# Patient Record
Sex: FEMALE | Race: WHITE | NOT HISPANIC OR LATINO | Employment: OTHER | URBAN - NONMETROPOLITAN AREA
[De-identification: names, ages, dates, MRNs, and addresses within clinical notes are randomized per-mention and may not be internally consistent; named-entity substitution may affect disease eponyms.]

---

## 2021-01-01 ENCOUNTER — HOSPITAL ENCOUNTER (EMERGENCY)
Facility: HOSPITAL | Age: 71
End: 2021-05-14
Attending: EMERGENCY MEDICINE

## 2021-01-01 ENCOUNTER — APPOINTMENT (EMERGENCY)
Dept: RADIOLOGY | Facility: HOSPITAL | Age: 71
End: 2021-01-01

## 2021-01-01 ENCOUNTER — APPOINTMENT (INPATIENT)
Dept: RADIOLOGY | Facility: HOSPITAL | Age: 71
DRG: 260 | End: 2021-01-01
Payer: COMMERCIAL

## 2021-01-01 ENCOUNTER — HOSPITAL ENCOUNTER (INPATIENT)
Facility: HOSPITAL | Age: 71
LOS: 2 days | DRG: 260 | End: 2021-05-16
Attending: INTERNAL MEDICINE | Admitting: EMERGENCY MEDICINE
Payer: COMMERCIAL

## 2021-01-01 ENCOUNTER — TELEPHONE (OUTPATIENT)
Dept: RADIOLOGY | Facility: HOSPITAL | Age: 71
End: 2021-01-01

## 2021-01-01 ENCOUNTER — APPOINTMENT (INPATIENT)
Dept: NON INVASIVE DIAGNOSTICS | Facility: HOSPITAL | Age: 71
DRG: 260 | End: 2021-01-01
Payer: COMMERCIAL

## 2021-01-01 VITALS
DIASTOLIC BLOOD PRESSURE: 76 MMHG | SYSTOLIC BLOOD PRESSURE: 147 MMHG | HEART RATE: 82 BPM | WEIGHT: 124.34 LBS | OXYGEN SATURATION: 79 % | TEMPERATURE: 96.5 F | RESPIRATION RATE: 18 BRPM

## 2021-01-01 VITALS
DIASTOLIC BLOOD PRESSURE: 54 MMHG | HEART RATE: 98 BPM | SYSTOLIC BLOOD PRESSURE: 79 MMHG | OXYGEN SATURATION: 96 % | RESPIRATION RATE: 21 BRPM | TEMPERATURE: 97.5 F | WEIGHT: 133.6 LBS

## 2021-01-01 DIAGNOSIS — N17.9 AKI (ACUTE KIDNEY INJURY) (HCC): ICD-10-CM

## 2021-01-01 DIAGNOSIS — I46.9 CARDIAC ARREST (HCC): Primary | ICD-10-CM

## 2021-01-01 DIAGNOSIS — J96.01 ACUTE RESPIRATORY FAILURE WITH HYPOXIA (HCC): ICD-10-CM

## 2021-01-01 DIAGNOSIS — R57.9 SHOCK (HCC): ICD-10-CM

## 2021-01-01 DIAGNOSIS — E87.2 METABOLIC ACIDOSIS: ICD-10-CM

## 2021-01-01 DIAGNOSIS — E87.2 LACTIC ACIDOSIS: ICD-10-CM

## 2021-01-01 DIAGNOSIS — I21.3 STEMI (ST ELEVATION MYOCARDIAL INFARCTION) (HCC): ICD-10-CM

## 2021-01-01 LAB
ABO GROUP BLD: NORMAL
ABO GROUP BLD: NORMAL
ALBUMIN SERPL BCP-MCNC: 1.8 G/DL (ref 3.5–5)
ALBUMIN SERPL BCP-MCNC: 2.3 G/DL (ref 3.5–5)
ALBUMIN SERPL BCP-MCNC: 2.4 G/DL (ref 3.5–5)
ALBUMIN SERPL BCP-MCNC: 2.5 G/DL (ref 3.5–5)
ALP SERPL-CCNC: 73 U/L (ref 46–116)
ALP SERPL-CCNC: 82 U/L (ref 46–116)
ALP SERPL-CCNC: 85 U/L (ref 46–116)
ALP SERPL-CCNC: 91 U/L (ref 46–116)
ALP SERPL-CCNC: 94 U/L (ref 46–116)
ALP SERPL-CCNC: 98 U/L (ref 46–116)
ALT SERPL W P-5'-P-CCNC: 105 U/L (ref 12–78)
ALT SERPL W P-5'-P-CCNC: 125 U/L (ref 12–78)
ALT SERPL W P-5'-P-CCNC: 1465 U/L (ref 12–78)
ALT SERPL W P-5'-P-CCNC: 1955 U/L (ref 12–78)
ALT SERPL W P-5'-P-CCNC: 1989 U/L (ref 12–78)
ALT SERPL W P-5'-P-CCNC: 2496 U/L (ref 12–78)
ANION GAP SERPL CALCULATED.3IONS-SCNC: 14 MMOL/L (ref 4–13)
ANION GAP SERPL CALCULATED.3IONS-SCNC: 15 MMOL/L (ref 4–13)
ANION GAP SERPL CALCULATED.3IONS-SCNC: 16 MMOL/L (ref 4–13)
ANION GAP SERPL CALCULATED.3IONS-SCNC: 17 MMOL/L (ref 4–13)
ANION GAP SERPL CALCULATED.3IONS-SCNC: 17 MMOL/L (ref 4–13)
ANION GAP SERPL CALCULATED.3IONS-SCNC: 18 MMOL/L (ref 4–13)
ANION GAP SERPL CALCULATED.3IONS-SCNC: 18 MMOL/L (ref 4–13)
ANION GAP SERPL CALCULATED.3IONS-SCNC: 20 MMOL/L (ref 4–13)
ANION GAP SERPL CALCULATED.3IONS-SCNC: 21 MMOL/L (ref 4–13)
ANION GAP SERPL CALCULATED.3IONS-SCNC: 21 MMOL/L (ref 4–13)
ANION GAP SERPL CALCULATED.3IONS-SCNC: 22 MMOL/L (ref 4–13)
ANION GAP SERPL CALCULATED.3IONS-SCNC: 22 MMOL/L (ref 4–13)
ANION GAP SERPL CALCULATED.3IONS-SCNC: 23 MMOL/L (ref 4–13)
ANION GAP SERPL CALCULATED.3IONS-SCNC: 23 MMOL/L (ref 4–13)
ANISOCYTOSIS BLD QL SMEAR: PRESENT
APAP SERPL-MCNC: 3 UG/ML (ref 10–20)
APTT PPP: 191 SECONDS (ref 23–37)
APTT PPP: 27 SECONDS (ref 23–37)
APTT PPP: 30 SECONDS (ref 23–37)
APTT PPP: 32 SECONDS (ref 23–37)
APTT PPP: 35 SECONDS (ref 23–37)
APTT PPP: 87 SECONDS (ref 23–37)
APTT PPP: >210 SECONDS (ref 23–37)
APTT PPP: >210 SECONDS (ref 23–37)
ARTERIAL PATENCY WRIST A: NO
ARTERIAL PATENCY WRIST A: YES
AST SERPL W P-5'-P-CCNC: 253 U/L (ref 5–45)
AST SERPL W P-5'-P-CCNC: 299 U/L (ref 5–45)
AST SERPL W P-5'-P-CCNC: 3559 U/L (ref 5–45)
AST SERPL W P-5'-P-CCNC: 5378 U/L (ref 5–45)
AST SERPL W P-5'-P-CCNC: 5545 U/L (ref 5–45)
AST SERPL W P-5'-P-CCNC: 6881 U/L (ref 5–45)
ATRIAL RATE: 107 BPM
ATRIAL RATE: 60 BPM
ATRIAL RATE: 60 BPM
ATRIAL RATE: 64 BPM
ATRIAL RATE: 68 BPM
ATRIAL RATE: 80 BPM
ATRIAL RATE: 84 BPM
ATRIAL RATE: 88 BPM
ATRIAL RATE: 97 BPM
ATRIAL RATE: 98 BPM
BACTERIA UR CULT: ABNORMAL
BACTERIA UR QL AUTO: ABNORMAL /HPF
BACTERIA UR QL AUTO: ABNORMAL /HPF
BASE EX.OXY STD BLDV CALC-SCNC: 34 % (ref 60–80)
BASE EX.OXY STD BLDV CALC-SCNC: 40.3 % (ref 60–80)
BASE EX.OXY STD BLDV CALC-SCNC: 43.1 % (ref 60–80)
BASE EX.OXY STD BLDV CALC-SCNC: 52.2 % (ref 60–80)
BASE EXCESS BLDA CALC-SCNC: -12.2 MMOL/L
BASE EXCESS BLDA CALC-SCNC: -12.5 MMOL/L
BASE EXCESS BLDA CALC-SCNC: -13 MMOL/L
BASE EXCESS BLDA CALC-SCNC: -13.1 MMOL/L
BASE EXCESS BLDA CALC-SCNC: -13.2 MMOL/L
BASE EXCESS BLDA CALC-SCNC: -17 MMOL/L
BASE EXCESS BLDA CALC-SCNC: -22.8 MMOL/L
BASE EXCESS BLDA CALC-SCNC: -6.5 MMOL/L
BASE EXCESS BLDA CALC-SCNC: -7 MMOL/L (ref -2–3)
BASE EXCESS BLDA CALC-SCNC: -7.5 MMOL/L
BASE EXCESS BLDA CALC-SCNC: -7.6 MMOL/L
BASE EXCESS BLDA CALC-SCNC: -8.4 MMOL/L
BASE EXCESS BLDA CALC-SCNC: -9 MMOL/L (ref -2–3)
BASE EXCESS BLDV CALC-SCNC: -12.4 MMOL/L
BASE EXCESS BLDV CALC-SCNC: -5.4 MMOL/L
BASE EXCESS BLDV CALC-SCNC: -9.1 MMOL/L
BASE EXCESS BLDV CALC-SCNC: -9.8 MMOL/L
BASOPHILS # BLD AUTO: 0.02 THOUSANDS/ΜL (ref 0–0.1)
BASOPHILS # BLD AUTO: 0.02 THOUSANDS/ΜL (ref 0–0.1)
BASOPHILS # BLD AUTO: 0.03 THOUSANDS/ΜL (ref 0–0.1)
BASOPHILS # BLD AUTO: 0.1 THOUSANDS/ΜL (ref 0–0.1)
BASOPHILS # BLD MANUAL: 0 THOUSAND/UL (ref 0–0.1)
BASOPHILS NFR BLD AUTO: 0 % (ref 0–1)
BASOPHILS NFR MAR MANUAL: 0 % (ref 0–1)
BILIRUB DIRECT SERPL-MCNC: 0.17 MG/DL (ref 0–0.2)
BILIRUB DIRECT SERPL-MCNC: 0.29 MG/DL (ref 0–0.2)
BILIRUB DIRECT SERPL-MCNC: 0.35 MG/DL (ref 0–0.2)
BILIRUB DIRECT SERPL-MCNC: 0.35 MG/DL (ref 0–0.2)
BILIRUB DIRECT SERPL-MCNC: 0.43 MG/DL (ref 0–0.2)
BILIRUB SERPL-MCNC: 0.21 MG/DL (ref 0.2–1)
BILIRUB SERPL-MCNC: 0.33 MG/DL (ref 0.2–1)
BILIRUB SERPL-MCNC: 0.45 MG/DL (ref 0.2–1)
BILIRUB SERPL-MCNC: 0.52 MG/DL (ref 0.2–1)
BILIRUB SERPL-MCNC: 0.58 MG/DL (ref 0.2–1)
BILIRUB SERPL-MCNC: 0.59 MG/DL (ref 0.2–1)
BILIRUB UR QL STRIP: ABNORMAL
BILIRUB UR QL STRIP: NEGATIVE
BLD GP AB SCN SERPL QL: NEGATIVE
BODY TEMPERATURE: 94.6 DEGREES FEHRENHEIT
BODY TEMPERATURE: 95.4 DEGREES FEHRENHEIT
BODY TEMPERATURE: 95.4 DEGREES FEHRENHEIT
BODY TEMPERATURE: 96.1 DEGREES FEHRENHEIT
BODY TEMPERATURE: 96.5 DEGREES FEHRENHEIT
BODY TEMPERATURE: 96.6 DEGREES FEHRENHEIT
BODY TEMPERATURE: 97.2 DEGREES FEHRENHEIT
BODY TEMPERATURE: 98.8 DEGREES FEHRENHEIT
BUN SERPL-MCNC: 27 MG/DL (ref 5–25)
BUN SERPL-MCNC: 29 MG/DL (ref 5–25)
BUN SERPL-MCNC: 32 MG/DL (ref 5–25)
BUN SERPL-MCNC: 33 MG/DL (ref 5–25)
BUN SERPL-MCNC: 35 MG/DL (ref 5–25)
BUN SERPL-MCNC: 35 MG/DL (ref 5–25)
BUN SERPL-MCNC: 36 MG/DL (ref 5–25)
BUN SERPL-MCNC: 37 MG/DL (ref 5–25)
BUN SERPL-MCNC: 37 MG/DL (ref 5–25)
BUN SERPL-MCNC: 38 MG/DL (ref 5–25)
BURR CELLS BLD QL SMEAR: PRESENT
CA-I BLD-SCNC: 0.98 MMOL/L (ref 1.12–1.32)
CA-I BLD-SCNC: 1.05 MMOL/L (ref 1.12–1.32)
CA-I BLD-SCNC: 1.09 MMOL/L (ref 1.12–1.32)
CA-I BLD-SCNC: 1.09 MMOL/L (ref 1.12–1.32)
CA-I BLD-SCNC: 1.1 MMOL/L (ref 1.12–1.32)
CA-I BLD-SCNC: 1.1 MMOL/L (ref 1.12–1.32)
CA-I BLD-SCNC: 1.13 MMOL/L (ref 1.12–1.32)
CA-I BLD-SCNC: 1.13 MMOL/L (ref 1.12–1.32)
CA-I BLD-SCNC: 1.18 MMOL/L (ref 1.12–1.32)
CALCIUM ALBUM COR SERPL-MCNC: 9 MG/DL (ref 8.3–10.1)
CALCIUM SERPL-MCNC: 6.8 MG/DL (ref 8.3–10.1)
CALCIUM SERPL-MCNC: 7.2 MG/DL (ref 8.3–10.1)
CALCIUM SERPL-MCNC: 7.4 MG/DL (ref 8.3–10.1)
CALCIUM SERPL-MCNC: 8 MG/DL (ref 8.3–10.1)
CALCIUM SERPL-MCNC: 8 MG/DL (ref 8.3–10.1)
CALCIUM SERPL-MCNC: 8.1 MG/DL (ref 8.3–10.1)
CALCIUM SERPL-MCNC: 8.1 MG/DL (ref 8.3–10.1)
CALCIUM SERPL-MCNC: 8.3 MG/DL (ref 8.3–10.1)
CALCIUM SERPL-MCNC: 8.7 MG/DL (ref 8.3–10.1)
CALCIUM SERPL-MCNC: 8.8 MG/DL (ref 8.3–10.1)
CALCIUM SERPL-MCNC: 8.8 MG/DL (ref 8.3–10.1)
CALCIUM SERPL-MCNC: 8.9 MG/DL (ref 8.3–10.1)
CALCIUM SERPL-MCNC: 9.1 MG/DL (ref 8.3–10.1)
CALCIUM SERPL-MCNC: 9.3 MG/DL (ref 8.3–10.1)
CHLORIDE SERPL-SCNC: 106 MMOL/L (ref 100–108)
CHLORIDE SERPL-SCNC: 107 MMOL/L (ref 100–108)
CHLORIDE SERPL-SCNC: 109 MMOL/L (ref 100–108)
CHLORIDE SERPL-SCNC: 110 MMOL/L (ref 100–108)
CHLORIDE SERPL-SCNC: 110 MMOL/L (ref 100–108)
CHLORIDE SERPL-SCNC: 113 MMOL/L (ref 100–108)
CHLORIDE SERPL-SCNC: 114 MMOL/L (ref 100–108)
CHLORIDE SERPL-SCNC: 114 MMOL/L (ref 100–108)
CHLORIDE SERPL-SCNC: 116 MMOL/L (ref 100–108)
CHLORIDE SERPL-SCNC: 116 MMOL/L (ref 100–108)
CHLORIDE SERPL-SCNC: 118 MMOL/L (ref 100–108)
CHLORIDE SERPL-SCNC: 118 MMOL/L (ref 100–108)
CLARITY UR: ABNORMAL
CLARITY UR: ABNORMAL
CO2 SERPL-SCNC: 11 MMOL/L (ref 21–32)
CO2 SERPL-SCNC: 13 MMOL/L (ref 21–32)
CO2 SERPL-SCNC: 16 MMOL/L (ref 21–32)
CO2 SERPL-SCNC: 17 MMOL/L (ref 21–32)
CO2 SERPL-SCNC: 19 MMOL/L (ref 21–32)
COLOR UR: ABNORMAL
COLOR UR: YELLOW
CORTIS SERPL-MCNC: 40.4 UG/DL
CREAT SERPL-MCNC: 1.68 MG/DL (ref 0.6–1.3)
CREAT SERPL-MCNC: 1.72 MG/DL (ref 0.6–1.3)
CREAT SERPL-MCNC: 1.77 MG/DL (ref 0.6–1.3)
CREAT SERPL-MCNC: 1.81 MG/DL (ref 0.6–1.3)
CREAT SERPL-MCNC: 1.85 MG/DL (ref 0.6–1.3)
CREAT SERPL-MCNC: 1.92 MG/DL (ref 0.6–1.3)
CREAT SERPL-MCNC: 1.96 MG/DL (ref 0.6–1.3)
CREAT SERPL-MCNC: 1.97 MG/DL (ref 0.6–1.3)
CREAT SERPL-MCNC: 2.16 MG/DL (ref 0.6–1.3)
CREAT SERPL-MCNC: 2.28 MG/DL (ref 0.6–1.3)
CREAT SERPL-MCNC: 2.3 MG/DL (ref 0.6–1.3)
CREAT SERPL-MCNC: 2.3 MG/DL (ref 0.6–1.3)
CREAT SERPL-MCNC: 2.39 MG/DL (ref 0.6–1.3)
CREAT SERPL-MCNC: 2.4 MG/DL (ref 0.6–1.3)
D DIMER PPP FEU-MCNC: >20 UG/ML FEU
DIGOXIN SERPL-MCNC: <0.2 NG/ML (ref 0.8–2)
DS:DELIVERY SYSTEM: ABNORMAL
EOSINOPHIL # BLD AUTO: 0 THOUSAND/ΜL (ref 0–0.61)
EOSINOPHIL # BLD AUTO: 0 THOUSAND/ΜL (ref 0–0.61)
EOSINOPHIL # BLD AUTO: 0.01 THOUSAND/ΜL (ref 0–0.61)
EOSINOPHIL # BLD AUTO: 0.01 THOUSAND/ΜL (ref 0–0.61)
EOSINOPHIL # BLD MANUAL: 0.18 THOUSAND/UL (ref 0–0.4)
EOSINOPHIL NFR BLD AUTO: 0 % (ref 0–6)
EOSINOPHIL NFR BLD MANUAL: 1 % (ref 0–6)
ERYTHROCYTE [DISTWIDTH] IN BLOOD BY AUTOMATED COUNT: 14.6 % (ref 11.6–15.1)
ERYTHROCYTE [DISTWIDTH] IN BLOOD BY AUTOMATED COUNT: 14.8 % (ref 11.6–15.1)
ERYTHROCYTE [DISTWIDTH] IN BLOOD BY AUTOMATED COUNT: 14.8 % (ref 11.6–15.1)
ERYTHROCYTE [DISTWIDTH] IN BLOOD BY AUTOMATED COUNT: 15 % (ref 11.6–15.1)
ERYTHROCYTE [DISTWIDTH] IN BLOOD BY AUTOMATED COUNT: 15 % (ref 11.6–15.1)
ERYTHROCYTE [DISTWIDTH] IN BLOOD BY AUTOMATED COUNT: 15.6 % (ref 11.6–15.1)
EST. AVERAGE GLUCOSE BLD GHB EST-MCNC: 134 MG/DL
FIO2 GAS DIL.REBREATH: 100 L
FIO2 GAS DIL.REBREATH: 6 L
GFR SERPL CREATININE-BSD FRML MDRD: 20 ML/MIN/1.73SQ M
GFR SERPL CREATININE-BSD FRML MDRD: 20 ML/MIN/1.73SQ M
GFR SERPL CREATININE-BSD FRML MDRD: 21 ML/MIN/1.73SQ M
GFR SERPL CREATININE-BSD FRML MDRD: 23 ML/MIN/1.73SQ M
GFR SERPL CREATININE-BSD FRML MDRD: 25 ML/MIN/1.73SQ M
GFR SERPL CREATININE-BSD FRML MDRD: 25 ML/MIN/1.73SQ M
GFR SERPL CREATININE-BSD FRML MDRD: 26 ML/MIN/1.73SQ M
GFR SERPL CREATININE-BSD FRML MDRD: 27 ML/MIN/1.73SQ M
GFR SERPL CREATININE-BSD FRML MDRD: 28 ML/MIN/1.73SQ M
GFR SERPL CREATININE-BSD FRML MDRD: 29 ML/MIN/1.73SQ M
GFR SERPL CREATININE-BSD FRML MDRD: 30 ML/MIN/1.73SQ M
GFR SERPL CREATININE-BSD FRML MDRD: 31 ML/MIN/1.73SQ M
GLUCOSE SERPL-MCNC: 134 MG/DL (ref 65–140)
GLUCOSE SERPL-MCNC: 135 MG/DL (ref 65–140)
GLUCOSE SERPL-MCNC: 140 MG/DL (ref 65–140)
GLUCOSE SERPL-MCNC: 146 MG/DL (ref 65–140)
GLUCOSE SERPL-MCNC: 147 MG/DL (ref 65–140)
GLUCOSE SERPL-MCNC: 155 MG/DL (ref 65–140)
GLUCOSE SERPL-MCNC: 155 MG/DL (ref 65–140)
GLUCOSE SERPL-MCNC: 157 MG/DL (ref 65–140)
GLUCOSE SERPL-MCNC: 162 MG/DL (ref 65–140)
GLUCOSE SERPL-MCNC: 166 MG/DL (ref 65–140)
GLUCOSE SERPL-MCNC: 166 MG/DL (ref 65–140)
GLUCOSE SERPL-MCNC: 169 MG/DL (ref 65–140)
GLUCOSE SERPL-MCNC: 171 MG/DL (ref 65–140)
GLUCOSE SERPL-MCNC: 172 MG/DL (ref 65–140)
GLUCOSE SERPL-MCNC: 175 MG/DL (ref 65–140)
GLUCOSE SERPL-MCNC: 180 MG/DL (ref 65–140)
GLUCOSE SERPL-MCNC: 188 MG/DL (ref 65–140)
GLUCOSE SERPL-MCNC: 202 MG/DL (ref 65–140)
GLUCOSE SERPL-MCNC: 204 MG/DL (ref 65–140)
GLUCOSE SERPL-MCNC: 205 MG/DL (ref 65–140)
GLUCOSE SERPL-MCNC: 208 MG/DL (ref 65–140)
GLUCOSE SERPL-MCNC: 209 MG/DL (ref 65–140)
GLUCOSE SERPL-MCNC: 209 MG/DL (ref 65–140)
GLUCOSE SERPL-MCNC: 210 MG/DL (ref 65–140)
GLUCOSE SERPL-MCNC: 219 MG/DL (ref 65–140)
GLUCOSE SERPL-MCNC: 229 MG/DL (ref 65–140)
GLUCOSE UR STRIP-MCNC: NEGATIVE MG/DL
GLUCOSE UR STRIP-MCNC: NEGATIVE MG/DL
HBA1C MFR BLD: 6.3 %
HCO3 BLDA-SCNC: 10.9 MMOL/L (ref 22–28)
HCO3 BLDA-SCNC: 11.2 MMOL/L (ref 22–28)
HCO3 BLDA-SCNC: 11.6 MMOL/L (ref 22–28)
HCO3 BLDA-SCNC: 12.1 MMOL/L (ref 22–28)
HCO3 BLDA-SCNC: 12.6 MMOL/L (ref 22–28)
HCO3 BLDA-SCNC: 14.4 MMOL/L (ref 22–28)
HCO3 BLDA-SCNC: 16.1 MMOL/L (ref 22–28)
HCO3 BLDA-SCNC: 16.2 MMOL/L (ref 22–28)
HCO3 BLDA-SCNC: 16.9 MMOL/L (ref 22–28)
HCO3 BLDA-SCNC: 17.3 MMOL/L (ref 22–28)
HCO3 BLDA-SCNC: 17.5 MMOL/L (ref 22–28)
HCO3 BLDA-SCNC: 7.4 MMOL/L (ref 22–28)
HCO3 BLDA-SCNC: 8.8 MMOL/L (ref 22–28)
HCO3 BLDV-SCNC: 13.4 MMOL/L (ref 24–30)
HCO3 BLDV-SCNC: 16 MMOL/L (ref 24–30)
HCO3 BLDV-SCNC: 16.2 MMOL/L (ref 24–30)
HCO3 BLDV-SCNC: 19.9 MMOL/L (ref 24–30)
HCT VFR BLD AUTO: 25.7 % (ref 34.8–46.1)
HCT VFR BLD AUTO: 30.6 % (ref 34.8–46.1)
HCT VFR BLD AUTO: 31.9 % (ref 34.8–46.1)
HCT VFR BLD AUTO: 32.7 % (ref 34.8–46.1)
HCT VFR BLD AUTO: 35.8 % (ref 34.8–46.1)
HCT VFR BLD AUTO: 36.1 % (ref 34.8–46.1)
HCT VFR BLD CALC: 29 % (ref 34.8–46.1)
HCT VFR BLD CALC: 33 % (ref 34.8–46.1)
HGB BLD-MCNC: 10.1 G/DL (ref 11.5–15.4)
HGB BLD-MCNC: 10.3 G/DL (ref 11.5–15.4)
HGB BLD-MCNC: 10.4 G/DL (ref 11.5–15.4)
HGB BLD-MCNC: 11.3 G/DL (ref 11.5–15.4)
HGB BLD-MCNC: 11.3 G/DL (ref 11.5–15.4)
HGB BLD-MCNC: 8.5 G/DL (ref 11.5–15.4)
HGB BLD-MCNC: 8.7 G/DL (ref 11.5–15.4)
HGB BLDA-MCNC: 11.2 G/DL (ref 11.5–15.4)
HGB BLDA-MCNC: 9.9 G/DL (ref 11.5–15.4)
HGB UR QL STRIP.AUTO: ABNORMAL
HGB UR QL STRIP.AUTO: ABNORMAL
HOROWITZ INDEX BLDA+IHG-RTO: 100 MM[HG]
HOROWITZ INDEX BLDA+IHG-RTO: 50 MM[HG]
HOROWITZ INDEX BLDA+IHG-RTO: 60 MM[HG]
HOROWITZ INDEX BLDA+IHG-RTO: 70 MM[HG]
HOROWITZ INDEX BLDA+IHG-RTO: 80 MM[HG]
HOROWITZ INDEX BLDA+IHG-RTO: 80 MM[HG]
I-TIME: 0.9
IMM GRANULOCYTES # BLD AUTO: 0.11 THOUSAND/UL (ref 0–0.2)
IMM GRANULOCYTES # BLD AUTO: 0.14 THOUSAND/UL (ref 0–0.2)
IMM GRANULOCYTES # BLD AUTO: 0.21 THOUSAND/UL (ref 0–0.2)
IMM GRANULOCYTES # BLD AUTO: 0.37 THOUSAND/UL (ref 0–0.2)
IMM GRANULOCYTES NFR BLD AUTO: 1 % (ref 0–2)
IMM GRANULOCYTES NFR BLD AUTO: 2 % (ref 0–2)
INR PPP: 1.35 (ref 0.84–1.19)
INR PPP: 1.35 (ref 0.84–1.19)
INR PPP: 1.36 (ref 0.84–1.19)
INR PPP: 1.49 (ref 0.84–1.19)
IPAP: 12
IRON SERPL-MCNC: 74 UG/DL (ref 50–170)
KETONES UR STRIP-MCNC: NEGATIVE MG/DL
KETONES UR STRIP-MCNC: NEGATIVE MG/DL
LACTATE SERPL-SCNC: 11.7 MMOL/L (ref 0.5–2)
LACTATE SERPL-SCNC: 11.8 MMOL/L (ref 0.5–2)
LACTATE SERPL-SCNC: 12.2 MMOL/L (ref 0.5–2)
LACTATE SERPL-SCNC: 12.2 MMOL/L (ref 0.5–2)
LACTATE SERPL-SCNC: 12.6 MMOL/L (ref 0.5–2)
LACTATE SERPL-SCNC: 12.7 MMOL/L (ref 0.5–2)
LACTATE SERPL-SCNC: 13.1 MMOL/L (ref 0.5–2)
LACTATE SERPL-SCNC: 13.6 MMOL/L (ref 0.5–2)
LACTATE SERPL-SCNC: 14.2 MMOL/L (ref 0.5–2)
LACTATE SERPL-SCNC: 4.1 MMOL/L (ref 0.5–2)
LACTATE SERPL-SCNC: 4.2 MMOL/L (ref 0.5–2)
LACTATE SERPL-SCNC: 4.7 MMOL/L (ref 0.5–2)
LACTATE SERPL-SCNC: 5 MMOL/L (ref 0.5–2)
LACTATE SERPL-SCNC: 5.8 MMOL/L (ref 0.5–2)
LACTATE SERPL-SCNC: 6.5 MMOL/L (ref 0.5–2)
LACTATE SERPL-SCNC: 7.2 MMOL/L (ref 0.5–2)
LACTATE SERPL-SCNC: 7.2 MMOL/L (ref 0.5–2)
LACTATE SERPL-SCNC: 7.8 MMOL/L (ref 0.5–2)
LACTATE SERPL-SCNC: 8.8 MMOL/L (ref 0.5–2)
LACTATE SERPL-SCNC: 9.3 MMOL/L (ref 0.5–2)
LACTATE SERPL-SCNC: 9.5 MMOL/L (ref 0.5–2)
LEUKOCYTE ESTERASE UR QL STRIP: ABNORMAL
LEUKOCYTE ESTERASE UR QL STRIP: ABNORMAL
LIPASE SERPL-CCNC: 283 U/L (ref 73–393)
LYMPHOCYTES # BLD AUTO: 0.78 THOUSANDS/ΜL (ref 0.6–4.47)
LYMPHOCYTES # BLD AUTO: 0.81 THOUSANDS/ΜL (ref 0.6–4.47)
LYMPHOCYTES # BLD AUTO: 0.93 THOUSANDS/ΜL (ref 0.6–4.47)
LYMPHOCYTES # BLD AUTO: 1.46 THOUSANDS/ΜL (ref 0.6–4.47)
LYMPHOCYTES # BLD AUTO: 1.93 THOUSAND/UL (ref 0.6–4.47)
LYMPHOCYTES # BLD AUTO: 11 % (ref 14–44)
LYMPHOCYTES NFR BLD AUTO: 4 % (ref 14–44)
LYMPHOCYTES NFR BLD AUTO: 4 % (ref 14–44)
LYMPHOCYTES NFR BLD AUTO: 5 % (ref 14–44)
LYMPHOCYTES NFR BLD AUTO: 9 % (ref 14–44)
MAGNESIUM SERPL-MCNC: 1.7 MG/DL (ref 1.6–2.6)
MAGNESIUM SERPL-MCNC: 2.1 MG/DL (ref 1.6–2.6)
MAGNESIUM SERPL-MCNC: 2.3 MG/DL (ref 1.6–2.6)
MAGNESIUM SERPL-MCNC: 2.5 MG/DL (ref 1.6–2.6)
MAGNESIUM SERPL-MCNC: 2.6 MG/DL (ref 1.6–2.6)
MAGNESIUM SERPL-MCNC: 2.7 MG/DL (ref 1.6–2.6)
MAGNESIUM SERPL-MCNC: 2.8 MG/DL (ref 1.6–2.6)
MAGNESIUM SERPL-MCNC: 3 MG/DL (ref 1.6–2.6)
MAGNESIUM SERPL-MCNC: 3.7 MG/DL (ref 1.6–2.6)
MCH RBC QN AUTO: 31.6 PG (ref 26.8–34.3)
MCH RBC QN AUTO: 31.7 PG (ref 26.8–34.3)
MCH RBC QN AUTO: 31.8 PG (ref 26.8–34.3)
MCH RBC QN AUTO: 32.1 PG (ref 26.8–34.3)
MCH RBC QN AUTO: 32.2 PG (ref 26.8–34.3)
MCH RBC QN AUTO: 32.7 PG (ref 26.8–34.3)
MCHC RBC AUTO-ENTMCNC: 31.3 G/DL (ref 31.4–37.4)
MCHC RBC AUTO-ENTMCNC: 31.6 G/DL (ref 31.4–37.4)
MCHC RBC AUTO-ENTMCNC: 31.8 G/DL (ref 31.4–37.4)
MCHC RBC AUTO-ENTMCNC: 32.3 G/DL (ref 31.4–37.4)
MCHC RBC AUTO-ENTMCNC: 33 G/DL (ref 31.4–37.4)
MCHC RBC AUTO-ENTMCNC: 33.1 G/DL (ref 31.4–37.4)
MCV RBC AUTO: 100 FL (ref 82–98)
MCV RBC AUTO: 101 FL (ref 82–98)
MCV RBC AUTO: 101 FL (ref 82–98)
MCV RBC AUTO: 97 FL (ref 82–98)
MCV RBC AUTO: 99 FL (ref 82–98)
MCV RBC AUTO: 99 FL (ref 82–98)
METAMYELOCYTES NFR BLD MANUAL: 1 % (ref 0–1)
MONOCYTES # BLD AUTO: 0.18 THOUSAND/UL (ref 0–1.22)
MONOCYTES # BLD AUTO: 1.27 THOUSAND/ΜL (ref 0.17–1.22)
MONOCYTES # BLD AUTO: 1.28 THOUSAND/ΜL (ref 0.17–1.22)
MONOCYTES # BLD AUTO: 1.48 THOUSAND/ΜL (ref 0.17–1.22)
MONOCYTES # BLD AUTO: 2.05 THOUSAND/ΜL (ref 0.17–1.22)
MONOCYTES NFR BLD AUTO: 8 % (ref 4–12)
MONOCYTES NFR BLD AUTO: 8 % (ref 4–12)
MONOCYTES NFR BLD AUTO: 9 % (ref 4–12)
MONOCYTES NFR BLD AUTO: 9 % (ref 4–12)
MONOCYTES NFR BLD: 1 % (ref 4–12)
MRSA NOSE QL CULT: ABNORMAL
MRSA NOSE QL CULT: ABNORMAL
MYELOCYTES NFR BLD MANUAL: 3 % (ref 0–1)
NASAL CANNULA: 4
NEUTROPHILS # BLD AUTO: 12.92 THOUSANDS/ΜL (ref 1.85–7.62)
NEUTROPHILS # BLD AUTO: 14 THOUSANDS/ΜL (ref 1.85–7.62)
NEUTROPHILS # BLD AUTO: 16.43 THOUSANDS/ΜL (ref 1.85–7.62)
NEUTROPHILS # BLD AUTO: 20.41 THOUSANDS/ΜL (ref 1.85–7.62)
NEUTROPHILS # BLD MANUAL: 14.58 THOUSAND/UL (ref 1.85–7.62)
NEUTS BAND NFR BLD MANUAL: 2 % (ref 0–8)
NEUTS SEG NFR BLD AUTO: 81 % (ref 43–75)
NEUTS SEG NFR BLD AUTO: 82 % (ref 43–75)
NEUTS SEG NFR BLD AUTO: 85 % (ref 43–75)
NEUTS SEG NFR BLD AUTO: 85 % (ref 43–75)
NEUTS SEG NFR BLD AUTO: 87 % (ref 43–75)
NITRITE UR QL STRIP: NEGATIVE
NITRITE UR QL STRIP: NEGATIVE
NON VENT- BIPAP: ABNORMAL
NON-SQ EPI CELLS URNS QL MICRO: ABNORMAL /HPF
NON-SQ EPI CELLS URNS QL MICRO: ABNORMAL /HPF
NRBC BLD AUTO-RTO: 0 /100 WBCS
NT-PROBNP SERPL-MCNC: 7841 PG/ML
O2 CT BLDA-SCNC: 10.2 ML/DL (ref 16–23)
O2 CT BLDA-SCNC: 11.1 ML/DL (ref 16–23)
O2 CT BLDA-SCNC: 11.3 ML/DL (ref 16–23)
O2 CT BLDA-SCNC: 12 ML/DL (ref 16–23)
O2 CT BLDA-SCNC: 13.1 ML/DL (ref 16–23)
O2 CT BLDA-SCNC: 14.7 ML/DL (ref 16–23)
O2 CT BLDA-SCNC: 14.8 ML/DL (ref 16–23)
O2 CT BLDA-SCNC: 15.8 ML/DL (ref 16–23)
O2 CT BLDA-SCNC: 17.1 ML/DL (ref 16–23)
O2 CT BLDA-SCNC: 17.1 ML/DL (ref 16–23)
O2 CT BLDA-SCNC: 9.9 ML/DL (ref 16–23)
O2 CT BLDV-SCNC: 4.5 ML/DL
O2 CT BLDV-SCNC: 5.3 ML/DL
O2 CT BLDV-SCNC: 5.7 ML/DL
O2 CT BLDV-SCNC: 6.9 ML/DL
OSMOLALITY UR/SERPL-RTO: 338 MMOL/KG (ref 282–298)
OVALOCYTES BLD QL SMEAR: PRESENT
OXYHGB MFR BLDA: 89.5 % (ref 94–97)
OXYHGB MFR BLDA: 92.5 % (ref 94–97)
OXYHGB MFR BLDA: 93.5 % (ref 94–97)
OXYHGB MFR BLDA: 94.4 % (ref 94–97)
OXYHGB MFR BLDA: 95.2 % (ref 94–97)
OXYHGB MFR BLDA: 95.9 % (ref 94–97)
OXYHGB MFR BLDA: 96.8 % (ref 94–97)
OXYHGB MFR BLDA: 97.4 % (ref 94–97)
OXYHGB MFR BLDA: 98.1 % (ref 94–97)
OXYHGB MFR BLDA: 98.2 % (ref 94–97)
OXYHGB MFR BLDA: 98.3 % (ref 94–97)
P AXIS: 4 DEGREES
P AXIS: 45 DEGREES
P AXIS: 69 DEGREES
P AXIS: 71 DEGREES
P AXIS: 71 DEGREES
P AXIS: 73 DEGREES
P AXIS: 78 DEGREES
P AXIS: 86 DEGREES
PCO2 BLD: 18 MMOL/L (ref 21–32)
PCO2 BLD: 18 MMOL/L (ref 21–32)
PCO2 BLD: 27 MM HG (ref 36–44)
PCO2 BLD: 29.3 MM HG (ref 42–50)
PCO2 BLD: 36.1 MM HG (ref 42–50)
PCO2 BLD: 38.7 MM HG (ref 36–44)
PCO2 BLDA: 20.5 MM HG (ref 36–44)
PCO2 BLDA: 22 MM HG (ref 36–44)
PCO2 BLDA: 22.4 MM HG (ref 36–44)
PCO2 BLDA: 22.4 MM HG (ref 36–44)
PCO2 BLDA: 23.2 MM HG (ref 36–44)
PCO2 BLDA: 23.9 MM HG (ref 36–44)
PCO2 BLDA: 25.4 MM HG (ref 36–44)
PCO2 BLDA: 25.5 MM HG (ref 36–44)
PCO2 BLDA: 27.7 MM HG (ref 36–44)
PCO2 BLDA: 28.9 MM HG (ref 36–44)
PCO2 BLDA: 30.5 MM HG (ref 36–44)
PCO2 BLDV: 30.3 MM HG (ref 42–50)
PCO2 BLDV: 31.6 MM HG (ref 42–50)
PCO2 BLDV: 35.9 MM HG (ref 42–50)
PCO2 BLDV: 37.9 MM HG (ref 42–50)
PCO2 TEMP ADJ BLDA: 18.9 MM HG (ref 36–44)
PCO2 TEMP ADJ BLDA: 22.5 MM HG (ref 36–44)
PCO2 TEMP ADJ BLDA: 23.9 MM HG (ref 36–44)
PCO2 TEMP ADJ BLDA: 24.3 MM HG (ref 36–44)
PCO2 TEMP ADJ BLDA: 26.7 MM HG (ref 36–44)
PCO2 TEMP ADJ BLDA: 28.9 MM HG (ref 36–44)
PEEP MAX SETTING VENT: 5 CM[H2O]
PEEP RESPIRATORY: 5 CM[H2O]
PEEP RESPIRATORY: 6 CM[H2O]
PEEP RESPIRATORY: 8 CM[H2O]
PH BLD: 7.01 [PH] (ref 7.35–7.45)
PH BLD: 7.26 [PH] (ref 7.35–7.45)
PH BLD: 7.28 [PH] (ref 7.3–7.4)
PH BLD: 7.33 [PH] (ref 7.35–7.45)
PH BLD: 7.35 [PH] (ref 7.3–7.4)
PH BLD: 7.37 [PH] (ref 7.35–7.45)
PH BLD: 7.41 [PH] (ref 7.35–7.45)
PH BLD: 7.42 [PH] (ref 7.35–7.45)
PH BLD: 7.42 [PH] (ref 7.35–7.45)
PH BLD: 7.43 [PH] (ref 7.35–7.45)
PH BLDA: 7 [PH] (ref 7.35–7.45)
PH BLDA: 7.22 [PH] (ref 7.35–7.45)
PH BLDA: 7.32 [PH] (ref 7.35–7.45)
PH BLDA: 7.34 [PH] (ref 7.35–7.45)
PH BLDA: 7.38 [PH] (ref 7.35–7.45)
PH BLDA: 7.4 [PH] (ref 7.35–7.45)
PH BLDA: 7.42 [PH] (ref 7.35–7.45)
PH BLDA: 7.43 [PH] (ref 7.35–7.45)
PH BLDV: 7.26 [PH] (ref 7.3–7.4)
PH BLDV: 7.27 [PH] (ref 7.3–7.4)
PH BLDV: 7.32 [PH] (ref 7.3–7.4)
PH BLDV: 7.34 [PH] (ref 7.3–7.4)
PH UR STRIP.AUTO: 5 [PH]
PH UR STRIP.AUTO: 6 [PH]
PHOSPHATE SERPL-MCNC: 2.6 MG/DL (ref 2.3–4.1)
PHOSPHATE SERPL-MCNC: 2.9 MG/DL (ref 2.3–4.1)
PHOSPHATE SERPL-MCNC: 3 MG/DL (ref 2.3–4.1)
PHOSPHATE SERPL-MCNC: 4.6 MG/DL (ref 2.3–4.1)
PHOSPHATE SERPL-MCNC: 4.7 MG/DL (ref 2.3–4.1)
PHOSPHATE SERPL-MCNC: 5 MG/DL (ref 2.3–4.1)
PLATELET # BLD AUTO: 169 THOUSANDS/UL (ref 149–390)
PLATELET # BLD AUTO: 187 THOUSANDS/UL (ref 149–390)
PLATELET # BLD AUTO: 204 THOUSANDS/UL (ref 149–390)
PLATELET # BLD AUTO: 226 THOUSANDS/UL (ref 149–390)
PLATELET BLD QL SMEAR: ADEQUATE
PMV BLD AUTO: 10.4 FL (ref 8.9–12.7)
PMV BLD AUTO: 10.5 FL (ref 8.9–12.7)
PMV BLD AUTO: 10.6 FL (ref 8.9–12.7)
PMV BLD AUTO: 10.8 FL (ref 8.9–12.7)
PMV BLD AUTO: 10.9 FL (ref 8.9–12.7)
PMV BLD AUTO: 11.1 FL (ref 8.9–12.7)
PO2 BLD: 208.1 MM HG (ref 75–129)
PO2 BLD: 255.9 MM HG (ref 75–129)
PO2 BLD: 316 MM HG (ref 75–129)
PO2 BLD: 61.2 MM HG (ref 75–129)
PO2 BLD: 74.7 MM HG (ref 75–129)
PO2 BLD: 78 MM HG (ref 75–129)
PO2 BLD: 78.2 MM HG (ref 75–129)
PO2 BLD: 92.1 MM HG (ref 75–129)
PO2 BLDA: 102.4 MM HG (ref 75–129)
PO2 BLDA: 152.3 MM HG (ref 75–129)
PO2 BLDA: 207.6 MM HG (ref 75–129)
PO2 BLDA: 237.8 MM HG (ref 75–129)
PO2 BLDA: 238.5 MM HG (ref 75–129)
PO2 BLDA: 261.6 MM HG (ref 75–129)
PO2 BLDA: 69.1 MM HG (ref 75–129)
PO2 BLDA: 81.8 MM HG (ref 75–129)
PO2 BLDA: 84 MM HG (ref 75–129)
PO2 BLDA: 92.6 MM HG (ref 75–129)
PO2 BLDA: 97.4 MM HG (ref 75–129)
PO2 BLDV: 25.8 MM HG (ref 35–45)
PO2 BLDV: 30.8 MM HG (ref 35–45)
PO2 BLDV: 31 MM HG (ref 35–45)
PO2 BLDV: 35.2 MM HG (ref 35–45)
PO2 VENOUS TEMP CORRECTED: 28.5 MM HG (ref 35–45)
PO2 VENOUS TEMP CORRECTED: 29.3 MM HG (ref 35–45)
POTASSIUM BLD-SCNC: 3.6 MMOL/L (ref 3.5–5.3)
POTASSIUM BLD-SCNC: 4 MMOL/L (ref 3.5–5.3)
POTASSIUM SERPL-SCNC: 3.6 MMOL/L (ref 3.5–5.3)
POTASSIUM SERPL-SCNC: 3.8 MMOL/L (ref 3.5–5.3)
POTASSIUM SERPL-SCNC: 3.9 MMOL/L (ref 3.5–5.3)
POTASSIUM SERPL-SCNC: 4 MMOL/L (ref 3.5–5.3)
POTASSIUM SERPL-SCNC: 4.2 MMOL/L (ref 3.5–5.3)
POTASSIUM SERPL-SCNC: 4.2 MMOL/L (ref 3.5–5.3)
POTASSIUM SERPL-SCNC: 4.3 MMOL/L (ref 3.5–5.3)
POTASSIUM SERPL-SCNC: 4.5 MMOL/L (ref 3.5–5.3)
POTASSIUM SERPL-SCNC: 5 MMOL/L (ref 3.5–5.3)
PR INTERVAL: 250 MS
PR INTERVAL: 304 MS
PR INTERVAL: 321 MS
PR INTERVAL: 388 MS
PR INTERVAL: 396 MS
PR INTERVAL: 413 MS
PR INTERVAL: 416 MS
PROCALCITONIN SERPL-MCNC: 4.41 NG/ML
PROCALCITONIN SERPL-MCNC: 6.76 NG/ML
PROCALCITONIN SERPL-MCNC: 7 NG/ML
PROT SERPL-MCNC: 4.6 G/DL (ref 6.4–8.2)
PROT SERPL-MCNC: 5.3 G/DL (ref 6.4–8.2)
PROT SERPL-MCNC: 5.3 G/DL (ref 6.4–8.2)
PROT SERPL-MCNC: 5.5 G/DL (ref 6.4–8.2)
PROT SERPL-MCNC: 5.5 G/DL (ref 6.4–8.2)
PROT SERPL-MCNC: 5.6 G/DL (ref 6.4–8.2)
PROT UR STRIP-MCNC: ABNORMAL MG/DL
PROT UR STRIP-MCNC: ABNORMAL MG/DL
PROTHROMBIN TIME: 16.6 SECONDS (ref 11.6–14.5)
PROTHROMBIN TIME: 16.6 SECONDS (ref 11.6–14.5)
PROTHROMBIN TIME: 16.7 SECONDS (ref 11.6–14.5)
PROTHROMBIN TIME: 17.7 SECONDS (ref 11.6–14.5)
PS VENT FIO2: 70
PS VENT PEEP: 8
QRS AXIS: 106 DEGREES
QRS AXIS: 108 DEGREES
QRS AXIS: 115 DEGREES
QRS AXIS: 116 DEGREES
QRS AXIS: 120 DEGREES
QRS AXIS: 121 DEGREES
QRS AXIS: 125 DEGREES
QRS AXIS: 125 DEGREES
QRS AXIS: 132 DEGREES
QRS AXIS: 99 DEGREES
QRSD INTERVAL: 104 MS
QRSD INTERVAL: 106 MS
QRSD INTERVAL: 112 MS
QRSD INTERVAL: 125 MS
QRSD INTERVAL: 75 MS
QRSD INTERVAL: 79 MS
QRSD INTERVAL: 79 MS
QRSD INTERVAL: 83 MS
QRSD INTERVAL: 88 MS
QRSD INTERVAL: 88 MS
QT INTERVAL: 321 MS
QT INTERVAL: 333 MS
QT INTERVAL: 346 MS
QT INTERVAL: 354 MS
QT INTERVAL: 392 MS
QT INTERVAL: 413 MS
QT INTERVAL: 418 MS
QT INTERVAL: 488 MS
QT INTERVAL: 498 MS
QT INTERVAL: 608 MS
QTC INTERVAL: 371 MS
QTC INTERVAL: 413 MS
QTC INTERVAL: 423 MS
QTC INTERVAL: 429 MS
QTC INTERVAL: 447 MS
QTC INTERVAL: 462 MS
QTC INTERVAL: 464 MS
QTC INTERVAL: 488 MS
QTC INTERVAL: 529 MS
QTC INTERVAL: 549 MS
RBC # BLD AUTO: 2.6 MILLION/UL (ref 3.81–5.12)
RBC # BLD AUTO: 3.15 MILLION/UL (ref 3.81–5.12)
RBC # BLD AUTO: 3.2 MILLION/UL (ref 3.81–5.12)
RBC # BLD AUTO: 3.29 MILLION/UL (ref 3.81–5.12)
RBC # BLD AUTO: 3.55 MILLION/UL (ref 3.81–5.12)
RBC # BLD AUTO: 3.56 MILLION/UL (ref 3.81–5.12)
RBC #/AREA URNS AUTO: ABNORMAL /HPF
RBC #/AREA URNS AUTO: ABNORMAL /HPF
RH BLD: NEGATIVE
RH BLD: NEGATIVE
SALICYLATES SERPL-MCNC: <3 MG/DL (ref 3–20)
SAO2 % BLD FROM PO2: 100 % (ref 60–85)
SAO2 % BLD FROM PO2: 96 % (ref 60–85)
SARS-COV-2 RNA RESP QL NAA+PROBE: NEGATIVE
SODIUM BLD-SCNC: 144 MMOL/L (ref 136–145)
SODIUM BLD-SCNC: 149 MMOL/L (ref 136–145)
SODIUM SERPL-SCNC: 143 MMOL/L (ref 136–145)
SODIUM SERPL-SCNC: 144 MMOL/L (ref 136–145)
SODIUM SERPL-SCNC: 145 MMOL/L (ref 136–145)
SODIUM SERPL-SCNC: 145 MMOL/L (ref 136–145)
SODIUM SERPL-SCNC: 146 MMOL/L (ref 136–145)
SODIUM SERPL-SCNC: 147 MMOL/L (ref 136–145)
SODIUM SERPL-SCNC: 150 MMOL/L (ref 136–145)
SP GR UR STRIP.AUTO: 1.02 (ref 1–1.03)
SP GR UR STRIP.AUTO: 1.02 (ref 1–1.03)
SPECIMEN EXPIRATION DATE: NORMAL
SPECIMEN SOURCE: ABNORMAL
T WAVE AXIS: -5 DEGREES
T WAVE AXIS: -67 DEGREES
T WAVE AXIS: 100 DEGREES
T WAVE AXIS: 100 DEGREES
T WAVE AXIS: 108 DEGREES
T WAVE AXIS: 108 DEGREES
T WAVE AXIS: 219 DEGREES
T WAVE AXIS: 72 DEGREES
T WAVE AXIS: 82 DEGREES
T WAVE AXIS: 83 DEGREES
TOTAL CELLS COUNTED SPEC: 100
TROPONIN I SERPL-MCNC: 0.64 NG/ML
TROPONIN I SERPL-MCNC: 15.5 NG/ML
TROPONIN I SERPL-MCNC: 2.48 NG/ML
TROPONIN I SERPL-MCNC: 8.06 NG/ML
TROPONIN I SERPL-MCNC: >40 NG/ML
TSH SERPL DL<=0.05 MIU/L-ACNC: 0.81 UIU/ML (ref 0.36–3.74)
UROBILINOGEN UR QL STRIP.AUTO: 0.2 E.U./DL
UROBILINOGEN UR QL STRIP.AUTO: 0.2 E.U./DL
VENT - PS: ABNORMAL
VENT AC: 18
VENT AC: 20
VENT AC: 22
VENT AC: 24
VENT AC: 24
VENT BIPAP FIO2: 70 %
VENT- AC: AC
VENTILATION VALUE: ABNORMAL
VENTRICULAR RATE: 107 BPM
VENTRICULAR RATE: 49 BPM
VENTRICULAR RATE: 60 BPM
VENTRICULAR RATE: 60 BPM
VENTRICULAR RATE: 68 BPM
VENTRICULAR RATE: 69 BPM
VENTRICULAR RATE: 80 BPM
VENTRICULAR RATE: 84 BPM
VENTRICULAR RATE: 88 BPM
VENTRICULAR RATE: 97 BPM
VT SETTING VENT: 420 ML
VT SETTING VENT: 500 ML
VT SETTING VENT: 500 ML
WBC # BLD AUTO: 15.11 THOUSAND/UL (ref 4.31–10.16)
WBC # BLD AUTO: 16.13 THOUSAND/UL (ref 4.31–10.16)
WBC # BLD AUTO: 16.9 THOUSAND/UL (ref 4.31–10.16)
WBC # BLD AUTO: 17.57 THOUSAND/UL (ref 4.31–10.16)
WBC # BLD AUTO: 18.96 THOUSAND/UL (ref 4.31–10.16)
WBC # BLD AUTO: 23.87 THOUSAND/UL (ref 4.31–10.16)
WBC #/AREA URNS AUTO: ABNORMAL /HPF
WBC #/AREA URNS AUTO: ABNORMAL /HPF

## 2021-01-01 PROCEDURE — B2181ZZ FLUOROSCOPY OF LEFT INTERNAL MAMMARY BYPASS GRAFT USING LOW OSMOLAR CONTRAST: ICD-10-PCS | Performed by: INTERNAL MEDICINE

## 2021-01-01 PROCEDURE — 84100 ASSAY OF PHOSPHORUS: CPT | Performed by: INTERNAL MEDICINE

## 2021-01-01 PROCEDURE — 94760 N-INVAS EAR/PLS OXIMETRY 1: CPT

## 2021-01-01 PROCEDURE — 82805 BLOOD GASES W/O2 SATURATION: CPT | Performed by: PHYSICIAN ASSISTANT

## 2021-01-01 PROCEDURE — 93970 EXTREMITY STUDY: CPT | Performed by: SURGERY

## 2021-01-01 PROCEDURE — 85007 BL SMEAR W/DIFF WBC COUNT: CPT | Performed by: EMERGENCY MEDICINE

## 2021-01-01 PROCEDURE — 84132 ASSAY OF SERUM POTASSIUM: CPT

## 2021-01-01 PROCEDURE — 94003 VENT MGMT INPAT SUBQ DAY: CPT

## 2021-01-01 PROCEDURE — 80179 DRUG ASSAY SALICYLATE: CPT | Performed by: INTERNAL MEDICINE

## 2021-01-01 PROCEDURE — 82948 REAGENT STRIP/BLOOD GLUCOSE: CPT

## 2021-01-01 PROCEDURE — 85379 FIBRIN DEGRADATION QUANT: CPT | Performed by: EMERGENCY MEDICINE

## 2021-01-01 PROCEDURE — 80076 HEPATIC FUNCTION PANEL: CPT | Performed by: PHYSICIAN ASSISTANT

## 2021-01-01 PROCEDURE — 99245 OFF/OP CONSLTJ NEW/EST HI 55: CPT | Performed by: INTERNAL MEDICINE

## 2021-01-01 PROCEDURE — 80335 ANTIDEPRESSANT TRICYCLIC 1/2: CPT | Performed by: EMERGENCY MEDICINE

## 2021-01-01 PROCEDURE — 82805 BLOOD GASES W/O2 SATURATION: CPT | Performed by: NURSE PRACTITIONER

## 2021-01-01 PROCEDURE — 87186 SC STD MICRODIL/AGAR DIL: CPT | Performed by: EMERGENCY MEDICINE

## 2021-01-01 PROCEDURE — 83605 ASSAY OF LACTIC ACID: CPT | Performed by: PHYSICIAN ASSISTANT

## 2021-01-01 PROCEDURE — 84100 ASSAY OF PHOSPHORUS: CPT | Performed by: PHYSICIAN ASSISTANT

## 2021-01-01 PROCEDURE — 80048 BASIC METABOLIC PNL TOTAL CA: CPT | Performed by: PHYSICIAN ASSISTANT

## 2021-01-01 PROCEDURE — 84145 PROCALCITONIN (PCT): CPT | Performed by: PHYSICIAN ASSISTANT

## 2021-01-01 PROCEDURE — 93459 L HRT ART/GRFT ANGIO: CPT | Performed by: INTERNAL MEDICINE

## 2021-01-01 PROCEDURE — 71045 X-RAY EXAM CHEST 1 VIEW: CPT

## 2021-01-01 PROCEDURE — 81001 URINALYSIS AUTO W/SCOPE: CPT | Performed by: PHYSICIAN ASSISTANT

## 2021-01-01 PROCEDURE — 83735 ASSAY OF MAGNESIUM: CPT | Performed by: PHYSICIAN ASSISTANT

## 2021-01-01 PROCEDURE — 82803 BLOOD GASES ANY COMBINATION: CPT

## 2021-01-01 PROCEDURE — 84295 ASSAY OF SERUM SODIUM: CPT

## 2021-01-01 PROCEDURE — 83540 ASSAY OF IRON: CPT | Performed by: EMERGENCY MEDICINE

## 2021-01-01 PROCEDURE — 83735 ASSAY OF MAGNESIUM: CPT | Performed by: INTERNAL MEDICINE

## 2021-01-01 PROCEDURE — 84484 ASSAY OF TROPONIN QUANT: CPT | Performed by: PHYSICIAN ASSISTANT

## 2021-01-01 PROCEDURE — U0003 INFECTIOUS AGENT DETECTION BY NUCLEIC ACID (DNA OR RNA); SEVERE ACUTE RESPIRATORY SYNDROME CORONAVIRUS 2 (SARS-COV-2) (CORONAVIRUS DISEASE [COVID-19]), AMPLIFIED PROBE TECHNIQUE, MAKING USE OF HIGH THROUGHPUT TECHNOLOGIES AS DESCRIBED BY CMS-2020-01-R: HCPCS | Performed by: EMERGENCY MEDICINE

## 2021-01-01 PROCEDURE — 83605 ASSAY OF LACTIC ACID: CPT | Performed by: EMERGENCY MEDICINE

## 2021-01-01 PROCEDURE — 85730 THROMBOPLASTIN TIME PARTIAL: CPT | Performed by: PHYSICIAN ASSISTANT

## 2021-01-01 PROCEDURE — 85014 HEMATOCRIT: CPT

## 2021-01-01 PROCEDURE — 80053 COMPREHEN METABOLIC PANEL: CPT | Performed by: EMERGENCY MEDICINE

## 2021-01-01 PROCEDURE — 99292 CRITICAL CARE ADDL 30 MIN: CPT | Performed by: EMERGENCY MEDICINE

## 2021-01-01 PROCEDURE — NC001 PR NO CHARGE: Performed by: INTERNAL MEDICINE

## 2021-01-01 PROCEDURE — 83735 ASSAY OF MAGNESIUM: CPT | Performed by: EMERGENCY MEDICINE

## 2021-01-01 PROCEDURE — 83690 ASSAY OF LIPASE: CPT | Performed by: NURSE PRACTITIONER

## 2021-01-01 PROCEDURE — 82330 ASSAY OF CALCIUM: CPT | Performed by: INTERNAL MEDICINE

## 2021-01-01 PROCEDURE — 31500 INSERT EMERGENCY AIRWAY: CPT

## 2021-01-01 PROCEDURE — 4A023N7 MEASUREMENT OF CARDIAC SAMPLING AND PRESSURE, LEFT HEART, PERCUTANEOUS APPROACH: ICD-10-PCS | Performed by: INTERNAL MEDICINE

## 2021-01-01 PROCEDURE — 93005 ELECTROCARDIOGRAM TRACING: CPT

## 2021-01-01 PROCEDURE — 87077 CULTURE AEROBIC IDENTIFY: CPT | Performed by: EMERGENCY MEDICINE

## 2021-01-01 PROCEDURE — 83036 HEMOGLOBIN GLYCOSYLATED A1C: CPT | Performed by: PHYSICIAN ASSISTANT

## 2021-01-01 PROCEDURE — 82533 TOTAL CORTISOL: CPT | Performed by: PHYSICIAN ASSISTANT

## 2021-01-01 PROCEDURE — 86900 BLOOD TYPING SEROLOGIC ABO: CPT | Performed by: PHYSICIAN ASSISTANT

## 2021-01-01 PROCEDURE — 85610 PROTHROMBIN TIME: CPT | Performed by: PHYSICIAN ASSISTANT

## 2021-01-01 PROCEDURE — 85025 COMPLETE CBC W/AUTO DIFF WBC: CPT | Performed by: PHYSICIAN ASSISTANT

## 2021-01-01 PROCEDURE — 36600 WITHDRAWAL OF ARTERIAL BLOOD: CPT

## 2021-01-01 PROCEDURE — 99233 SBSQ HOSP IP/OBS HIGH 50: CPT | Performed by: INTERNAL MEDICINE

## 2021-01-01 PROCEDURE — 99255 IP/OBS CONSLTJ NEW/EST HI 80: CPT | Performed by: INTERNAL MEDICINE

## 2021-01-01 PROCEDURE — 83880 ASSAY OF NATRIURETIC PEPTIDE: CPT | Performed by: EMERGENCY MEDICINE

## 2021-01-01 PROCEDURE — 85025 COMPLETE CBC W/AUTO DIFF WBC: CPT | Performed by: INTERNAL MEDICINE

## 2021-01-01 PROCEDURE — 99292 CRITICAL CARE ADDL 30 MIN: CPT | Performed by: PHYSICIAN ASSISTANT

## 2021-01-01 PROCEDURE — 5A1D90Z PERFORMANCE OF URINARY FILTRATION, CONTINUOUS, GREATER THAN 18 HOURS PER DAY: ICD-10-PCS | Performed by: ANESTHESIOLOGY

## 2021-01-01 PROCEDURE — 82330 ASSAY OF CALCIUM: CPT | Performed by: PHYSICIAN ASSISTANT

## 2021-01-01 PROCEDURE — 82805 BLOOD GASES W/O2 SATURATION: CPT | Performed by: EMERGENCY MEDICINE

## 2021-01-01 PROCEDURE — 93010 ELECTROCARDIOGRAM REPORT: CPT | Performed by: INTERNAL MEDICINE

## 2021-01-01 PROCEDURE — 85018 HEMOGLOBIN: CPT | Performed by: NURSE PRACTITIONER

## 2021-01-01 PROCEDURE — 02HK3JZ INSERTION OF PACEMAKER LEAD INTO RIGHT VENTRICLE, PERCUTANEOUS APPROACH: ICD-10-PCS | Performed by: EMERGENCY MEDICINE

## 2021-01-01 PROCEDURE — 99291 CRITICAL CARE FIRST HOUR: CPT | Performed by: EMERGENCY MEDICINE

## 2021-01-01 PROCEDURE — 94002 VENT MGMT INPAT INIT DAY: CPT

## 2021-01-01 PROCEDURE — 80335 ANTIDEPRESSANT TRICYCLIC 1/2: CPT | Performed by: PHYSICIAN ASSISTANT

## 2021-01-01 PROCEDURE — 84484 ASSAY OF TROPONIN QUANT: CPT | Performed by: INTERNAL MEDICINE

## 2021-01-01 PROCEDURE — 80162 ASSAY OF DIGOXIN TOTAL: CPT | Performed by: EMERGENCY MEDICINE

## 2021-01-01 PROCEDURE — 31500 INSERT EMERGENCY AIRWAY: CPT | Performed by: EMERGENCY MEDICINE

## 2021-01-01 PROCEDURE — 36556 INSERT NON-TUNNEL CV CATH: CPT | Performed by: ANESTHESIOLOGY

## 2021-01-01 PROCEDURE — NC001 PR NO CHARGE: Performed by: PHYSICIAN ASSISTANT

## 2021-01-01 PROCEDURE — 0BH18EZ INSERTION OF ENDOTRACHEAL AIRWAY INTO TRACHEA, VIA NATURAL OR ARTIFICIAL OPENING ENDOSCOPIC: ICD-10-PCS | Performed by: EMERGENCY MEDICINE

## 2021-01-01 PROCEDURE — 80143 DRUG ASSAY ACETAMINOPHEN: CPT | Performed by: INTERNAL MEDICINE

## 2021-01-01 PROCEDURE — 85027 COMPLETE CBC AUTOMATED: CPT | Performed by: EMERGENCY MEDICINE

## 2021-01-01 PROCEDURE — 93459 L HRT ART/GRFT ANGIO: CPT

## 2021-01-01 PROCEDURE — 85730 THROMBOPLASTIN TIME PARTIAL: CPT | Performed by: EMERGENCY MEDICINE

## 2021-01-01 PROCEDURE — 36556 INSERT NON-TUNNEL CV CATH: CPT | Performed by: EMERGENCY MEDICINE

## 2021-01-01 PROCEDURE — 93308 TTE F-UP OR LMTD: CPT | Performed by: EMERGENCY MEDICINE

## 2021-01-01 PROCEDURE — 80320 DRUG SCREEN QUANTALCOHOLS: CPT | Performed by: EMERGENCY MEDICINE

## 2021-01-01 PROCEDURE — NC001 PR NO CHARGE: Performed by: EMERGENCY MEDICINE

## 2021-01-01 PROCEDURE — 87081 CULTURE SCREEN ONLY: CPT | Performed by: PHYSICIAN ASSISTANT

## 2021-01-01 PROCEDURE — 81001 URINALYSIS AUTO W/SCOPE: CPT | Performed by: EMERGENCY MEDICINE

## 2021-01-01 PROCEDURE — B31N1ZZ FLUOROSCOPY OF OTHER UPPER ARTERIES USING LOW OSMOLAR CONTRAST: ICD-10-PCS | Performed by: INTERNAL MEDICINE

## 2021-01-01 PROCEDURE — 86901 BLOOD TYPING SEROLOGIC RH(D): CPT | Performed by: PHYSICIAN ASSISTANT

## 2021-01-01 PROCEDURE — 36415 COLL VENOUS BLD VENIPUNCTURE: CPT | Performed by: EMERGENCY MEDICINE

## 2021-01-01 PROCEDURE — 99291 CRITICAL CARE FIRST HOUR: CPT

## 2021-01-01 PROCEDURE — 99291 CRITICAL CARE FIRST HOUR: CPT | Performed by: NURSE PRACTITIONER

## 2021-01-01 PROCEDURE — 85730 THROMBOPLASTIN TIME PARTIAL: CPT | Performed by: INTERNAL MEDICINE

## 2021-01-01 PROCEDURE — 5A1223Z PERFORMANCE OF CARDIAC PACING, CONTINUOUS: ICD-10-PCS | Performed by: EMERGENCY MEDICINE

## 2021-01-01 PROCEDURE — 36620 INSERTION CATHETER ARTERY: CPT | Performed by: ANESTHESIOLOGY

## 2021-01-01 PROCEDURE — 5A1935Z RESPIRATORY VENTILATION, LESS THAN 24 CONSECUTIVE HOURS: ICD-10-PCS | Performed by: EMERGENCY MEDICINE

## 2021-01-01 PROCEDURE — 84443 ASSAY THYROID STIM HORMONE: CPT | Performed by: PHYSICIAN ASSISTANT

## 2021-01-01 PROCEDURE — 93306 TTE W/DOPPLER COMPLETE: CPT

## 2021-01-01 PROCEDURE — C1894 INTRO/SHEATH, NON-LASER: HCPCS

## 2021-01-01 PROCEDURE — 92950 HEART/LUNG RESUSCITATION CPR: CPT

## 2021-01-01 PROCEDURE — C1760 CLOSURE DEV, VASC: HCPCS

## 2021-01-01 PROCEDURE — 85027 COMPLETE CBC AUTOMATED: CPT | Performed by: PHYSICIAN ASSISTANT

## 2021-01-01 PROCEDURE — 90945 DIALYSIS ONE EVALUATION: CPT

## 2021-01-01 PROCEDURE — 86850 RBC ANTIBODY SCREEN: CPT | Performed by: PHYSICIAN ASSISTANT

## 2021-01-01 PROCEDURE — 99292 CRITICAL CARE ADDL 30 MIN: CPT | Performed by: NURSE PRACTITIONER

## 2021-01-01 PROCEDURE — C1769 GUIDE WIRE: HCPCS

## 2021-01-01 PROCEDURE — 80048 BASIC METABOLIC PNL TOTAL CA: CPT | Performed by: ANESTHESIOLOGY

## 2021-01-01 PROCEDURE — 71250 CT THORAX DX C-: CPT

## 2021-01-01 PROCEDURE — 93306 TTE W/DOPPLER COMPLETE: CPT | Performed by: INTERNAL MEDICINE

## 2021-01-01 PROCEDURE — NC001 PR NO CHARGE: Performed by: NURSE PRACTITIONER

## 2021-01-01 PROCEDURE — G1004 CDSM NDSC: HCPCS

## 2021-01-01 PROCEDURE — 85025 COMPLETE CBC W/AUTO DIFF WBC: CPT | Performed by: EMERGENCY MEDICINE

## 2021-01-01 PROCEDURE — 83930 ASSAY OF BLOOD OSMOLALITY: CPT | Performed by: INTERNAL MEDICINE

## 2021-01-01 PROCEDURE — 70450 CT HEAD/BRAIN W/O DYE: CPT

## 2021-01-01 PROCEDURE — 99232 SBSQ HOSP IP/OBS MODERATE 35: CPT | Performed by: INTERNAL MEDICINE

## 2021-01-01 PROCEDURE — 85610 PROTHROMBIN TIME: CPT | Performed by: EMERGENCY MEDICINE

## 2021-01-01 PROCEDURE — U0005 INFEC AGEN DETEC AMPLI PROBE: HCPCS | Performed by: EMERGENCY MEDICINE

## 2021-01-01 PROCEDURE — 71275 CT ANGIOGRAPHY CHEST: CPT

## 2021-01-01 PROCEDURE — 96365 THER/PROPH/DIAG IV INF INIT: CPT

## 2021-01-01 PROCEDURE — 82947 ASSAY GLUCOSE BLOOD QUANT: CPT

## 2021-01-01 PROCEDURE — 82805 BLOOD GASES W/O2 SATURATION: CPT | Performed by: INTERNAL MEDICINE

## 2021-01-01 PROCEDURE — 02HV33Z INSERTION OF INFUSION DEVICE INTO SUPERIOR VENA CAVA, PERCUTANEOUS APPROACH: ICD-10-PCS | Performed by: ANESTHESIOLOGY

## 2021-01-01 PROCEDURE — 87040 BLOOD CULTURE FOR BACTERIA: CPT | Performed by: EMERGENCY MEDICINE

## 2021-01-01 PROCEDURE — 87086 URINE CULTURE/COLONY COUNT: CPT | Performed by: EMERGENCY MEDICINE

## 2021-01-01 PROCEDURE — 82693 ASSAY OF ETHYLENE GLYCOL: CPT | Performed by: EMERGENCY MEDICINE

## 2021-01-01 PROCEDURE — 93970 EXTREMITY STUDY: CPT

## 2021-01-01 PROCEDURE — 99292 CRITICAL CARE ADDL 30 MIN: CPT | Performed by: ANESTHESIOLOGY

## 2021-01-01 PROCEDURE — 80076 HEPATIC FUNCTION PANEL: CPT | Performed by: INTERNAL MEDICINE

## 2021-01-01 PROCEDURE — 33210 INSERT ELECTRD/PM CATH SNGL: CPT | Performed by: EMERGENCY MEDICINE

## 2021-01-01 PROCEDURE — 93971 EXTREMITY STUDY: CPT | Performed by: EMERGENCY MEDICINE

## 2021-01-01 PROCEDURE — 84484 ASSAY OF TROPONIN QUANT: CPT | Performed by: EMERGENCY MEDICINE

## 2021-01-01 PROCEDURE — 74176 CT ABD & PELVIS W/O CONTRAST: CPT

## 2021-01-01 PROCEDURE — 02H633Z INSERTION OF INFUSION DEVICE INTO RIGHT ATRIUM, PERCUTANEOUS APPROACH: ICD-10-PCS | Performed by: EMERGENCY MEDICINE

## 2021-01-01 PROCEDURE — 87040 BLOOD CULTURE FOR BACTERIA: CPT | Performed by: PHYSICIAN ASSISTANT

## 2021-01-01 RX ORDER — CHLORHEXIDINE GLUCONATE 0.12 MG/ML
15 RINSE ORAL EVERY 12 HOURS SCHEDULED
Status: DISCONTINUED | OUTPATIENT
Start: 2021-01-01 | End: 2021-01-01

## 2021-01-01 RX ORDER — CALCIUM GLUCONATE 20 MG/ML
2 INJECTION, SOLUTION INTRAVENOUS ONCE
Status: COMPLETED | OUTPATIENT
Start: 2021-01-01 | End: 2021-01-01

## 2021-01-01 RX ORDER — SODIUM CHLORIDE 9 MG/ML
INJECTION, SOLUTION INTRAVENOUS
Status: COMPLETED | OUTPATIENT
Start: 2021-01-01 | End: 2021-01-01

## 2021-01-01 RX ORDER — CALCIUM GLUCONATE 94 MG/ML
1 INJECTION, SOLUTION INTRAVENOUS ONCE
Status: DISCONTINUED | OUTPATIENT
Start: 2021-01-01 | End: 2021-01-01 | Stop reason: CLARIF

## 2021-01-01 RX ORDER — HEPARIN SODIUM 1000 [USP'U]/ML
4400 INJECTION, SOLUTION INTRAVENOUS; SUBCUTANEOUS
Status: DISCONTINUED | OUTPATIENT
Start: 2021-01-01 | End: 2021-01-01

## 2021-01-01 RX ORDER — ALBUMIN, HUMAN INJ 5% 5 %
SOLUTION INTRAVENOUS
Status: DISPENSED
Start: 2021-01-01 | End: 2021-01-01

## 2021-01-01 RX ORDER — FENTANYL CITRATE 50 UG/ML
100 INJECTION, SOLUTION INTRAMUSCULAR; INTRAVENOUS ONCE
Status: DISCONTINUED | OUTPATIENT
Start: 2021-01-01 | End: 2021-01-01

## 2021-01-01 RX ORDER — EPINEPHRINE 0.1 MG/ML
SYRINGE (ML) INJECTION CODE/TRAUMA/SEDATION MEDICATION
Status: COMPLETED | OUTPATIENT
Start: 2021-01-01 | End: 2021-01-01

## 2021-01-01 RX ORDER — SODIUM CHLORIDE, SODIUM GLUCONATE, SODIUM ACETATE, POTASSIUM CHLORIDE, MAGNESIUM CHLORIDE, SODIUM PHOSPHATE, DIBASIC, AND POTASSIUM PHOSPHATE .53; .5; .37; .037; .03; .012; .00082 G/100ML; G/100ML; G/100ML; G/100ML; G/100ML; G/100ML; G/100ML
50 INJECTION, SOLUTION INTRAVENOUS CONTINUOUS
Status: DISCONTINUED | OUTPATIENT
Start: 2021-01-01 | End: 2021-01-01

## 2021-01-01 RX ORDER — SODIUM CHLORIDE, SODIUM GLUCONATE, SODIUM ACETATE, POTASSIUM CHLORIDE, MAGNESIUM CHLORIDE, SODIUM PHOSPHATE, DIBASIC, AND POTASSIUM PHOSPHATE .53; .5; .37; .037; .03; .012; .00082 G/100ML; G/100ML; G/100ML; G/100ML; G/100ML; G/100ML; G/100ML
500 INJECTION, SOLUTION INTRAVENOUS ONCE
Status: COMPLETED | OUTPATIENT
Start: 2021-01-01 | End: 2021-01-01

## 2021-01-01 RX ORDER — ALBUMIN, HUMAN INJ 5% 5 %
12.5 SOLUTION INTRAVENOUS ONCE
Status: COMPLETED | OUTPATIENT
Start: 2021-01-01 | End: 2021-01-01

## 2021-01-01 RX ORDER — NITROGLYCERIN 0.4 MG/1
0.4 TABLET SUBLINGUAL
COMMUNITY

## 2021-01-01 RX ORDER — LABETALOL 20 MG/4 ML (5 MG/ML) INTRAVENOUS SYRINGE
10 EVERY 6 HOURS PRN
Status: DISCONTINUED | OUTPATIENT
Start: 2021-01-01 | End: 2021-01-01

## 2021-01-01 RX ORDER — FENTANYL CITRATE 50 UG/ML
INJECTION, SOLUTION INTRAMUSCULAR; INTRAVENOUS
Status: COMPLETED
Start: 2021-01-01 | End: 2021-01-01

## 2021-01-01 RX ORDER — CALCIUM GLUCONATE 20 MG/ML
1 INJECTION, SOLUTION INTRAVENOUS ONCE
Status: DISCONTINUED | OUTPATIENT
Start: 2021-01-01 | End: 2021-01-01

## 2021-01-01 RX ORDER — SODIUM CHLORIDE, SODIUM GLUCONATE, SODIUM ACETATE, POTASSIUM CHLORIDE, MAGNESIUM CHLORIDE, SODIUM PHOSPHATE, DIBASIC, AND POTASSIUM PHOSPHATE .53; .5; .37; .037; .03; .012; .00082 G/100ML; G/100ML; G/100ML; G/100ML; G/100ML; G/100ML; G/100ML
250 INJECTION, SOLUTION INTRAVENOUS ONCE
Status: COMPLETED | OUTPATIENT
Start: 2021-01-01 | End: 2021-01-01

## 2021-01-01 RX ORDER — HEPARIN SODIUM 1000 [USP'U]/ML
4400 INJECTION, SOLUTION INTRAVENOUS; SUBCUTANEOUS ONCE
Status: COMPLETED | OUTPATIENT
Start: 2021-01-01 | End: 2021-01-01

## 2021-01-01 RX ORDER — ACETAMINOPHEN 500 MG
500 TABLET ORAL EVERY 6 HOURS PRN
COMMUNITY

## 2021-01-01 RX ORDER — SODIUM CHLORIDE, SODIUM GLUCONATE, SODIUM ACETATE, POTASSIUM CHLORIDE, MAGNESIUM CHLORIDE, SODIUM PHOSPHATE, DIBASIC, AND POTASSIUM PHOSPHATE .53; .5; .37; .037; .03; .012; .00082 G/100ML; G/100ML; G/100ML; G/100ML; G/100ML; G/100ML; G/100ML
500 INJECTION, SOLUTION INTRAVENOUS ONCE
Status: DISCONTINUED | OUTPATIENT
Start: 2021-01-01 | End: 2021-01-01

## 2021-01-01 RX ORDER — LEVETIRACETAM 500 MG/1
500 TABLET ORAL EVERY 12 HOURS SCHEDULED
COMMUNITY

## 2021-01-01 RX ORDER — SODIUM BICARBONATE 84 MG/ML
INJECTION, SOLUTION INTRAVENOUS CODE/TRAUMA/SEDATION MEDICATION
Status: COMPLETED | OUTPATIENT
Start: 2021-01-01 | End: 2021-01-01

## 2021-01-01 RX ORDER — MIDAZOLAM HYDROCHLORIDE 2 MG/2ML
1 INJECTION, SOLUTION INTRAMUSCULAR; INTRAVENOUS ONCE
Status: COMPLETED | OUTPATIENT
Start: 2021-01-01 | End: 2021-01-01

## 2021-01-01 RX ORDER — MIDAZOLAM HYDROCHLORIDE 2 MG/2ML
INJECTION, SOLUTION INTRAMUSCULAR; INTRAVENOUS CODE/TRAUMA/SEDATION MEDICATION
Status: COMPLETED | OUTPATIENT
Start: 2021-01-01 | End: 2021-01-01

## 2021-01-01 RX ORDER — FENTANYL CITRATE 50 UG/ML
100 INJECTION, SOLUTION INTRAMUSCULAR; INTRAVENOUS ONCE
Status: COMPLETED | OUTPATIENT
Start: 2021-01-01 | End: 2021-01-01

## 2021-01-01 RX ORDER — CALCIUM CHLORIDE 100 MG/ML
1 INJECTION INTRAVENOUS; INTRAVENTRICULAR ONCE
Status: DISCONTINUED | OUTPATIENT
Start: 2021-01-01 | End: 2021-01-01

## 2021-01-01 RX ORDER — FENTANYL CITRATE 50 UG/ML
50 INJECTION, SOLUTION INTRAMUSCULAR; INTRAVENOUS EVERY 2 HOUR PRN
Status: DISCONTINUED | OUTPATIENT
Start: 2021-01-01 | End: 2021-01-01

## 2021-01-01 RX ORDER — FERROUS SULFATE 325(65) MG
325 TABLET ORAL
COMMUNITY

## 2021-01-01 RX ORDER — HEPARIN SODIUM 1000 [USP'U]/ML
2200 INJECTION, SOLUTION INTRAVENOUS; SUBCUTANEOUS
Status: DISCONTINUED | OUTPATIENT
Start: 2021-01-01 | End: 2021-01-01

## 2021-01-01 RX ORDER — FENTANYL CITRATE 50 UG/ML
50 INJECTION, SOLUTION INTRAMUSCULAR; INTRAVENOUS ONCE
Status: COMPLETED | OUTPATIENT
Start: 2021-01-01 | End: 2021-01-01

## 2021-01-01 RX ORDER — FENTANYL CITRATE-0.9 % NACL/PF 10 MCG/ML
100 PLASTIC BAG, INJECTION (ML) INTRAVENOUS CONTINUOUS
Status: DISCONTINUED | OUTPATIENT
Start: 2021-01-01 | End: 2021-05-17 | Stop reason: HOSPADM

## 2021-01-01 RX ORDER — CALCIUM CHLORIDE 100 MG/ML
1 INJECTION INTRAVENOUS; INTRAVENTRICULAR ONCE
Status: COMPLETED | OUTPATIENT
Start: 2021-01-01 | End: 2021-01-01

## 2021-01-01 RX ORDER — POTASSIUM CHLORIDE 29.8 MG/ML
40 INJECTION INTRAVENOUS ONCE
Status: COMPLETED | OUTPATIENT
Start: 2021-01-01 | End: 2021-01-01

## 2021-01-01 RX ORDER — LORAZEPAM 2 MG/ML
2 INJECTION INTRAMUSCULAR ONCE
Status: COMPLETED | OUTPATIENT
Start: 2021-01-01 | End: 2021-01-01

## 2021-01-01 RX ORDER — LIDOCAINE HYDROCHLORIDE 10 MG/ML
INJECTION, SOLUTION EPIDURAL; INFILTRATION; INTRACAUDAL; PERINEURAL CODE/TRAUMA/SEDATION MEDICATION
Status: COMPLETED | OUTPATIENT
Start: 2021-01-01 | End: 2021-01-01

## 2021-01-01 RX ORDER — SUCCINYLCHOLINE/SOD CL,ISO/PF 100 MG/5ML
100 SYRINGE (ML) INTRAVENOUS ONCE
Status: COMPLETED | OUTPATIENT
Start: 2021-01-01 | End: 2021-01-01

## 2021-01-01 RX ORDER — PROPOFOL 10 MG/ML
INJECTION, EMULSION INTRAVENOUS
Status: DISCONTINUED
Start: 2021-01-01 | End: 2021-01-01 | Stop reason: WASHOUT

## 2021-01-01 RX ORDER — PANTOPRAZOLE SODIUM 40 MG/1
40 TABLET, DELAYED RELEASE ORAL
Status: DISCONTINUED | OUTPATIENT
Start: 2021-01-01 | End: 2021-01-01

## 2021-01-01 RX ORDER — FENTANYL CITRATE 50 UG/ML
50 INJECTION, SOLUTION INTRAMUSCULAR; INTRAVENOUS
Status: DISCONTINUED | OUTPATIENT
Start: 2021-01-01 | End: 2021-01-01

## 2021-01-01 RX ORDER — AMOXICILLIN 250 MG
1 CAPSULE ORAL 2 TIMES DAILY
Status: DISCONTINUED | OUTPATIENT
Start: 2021-01-01 | End: 2021-01-01

## 2021-01-01 RX ORDER — PROPOFOL 10 MG/ML
5-50 INJECTION, EMULSION INTRAVENOUS
Status: DISCONTINUED | OUTPATIENT
Start: 2021-01-01 | End: 2021-01-01

## 2021-01-01 RX ORDER — FENTANYL CITRATE 50 UG/ML
INJECTION, SOLUTION INTRAMUSCULAR; INTRAVENOUS
Status: DISPENSED
Start: 2021-01-01 | End: 2021-01-01

## 2021-01-01 RX ORDER — CALCIUM GLUCONATE 20 MG/ML
1 INJECTION, SOLUTION INTRAVENOUS ONCE
Status: COMPLETED | OUTPATIENT
Start: 2021-01-01 | End: 2021-01-01

## 2021-01-01 RX ORDER — HYDRALAZINE HYDROCHLORIDE 25 MG/1
25 TABLET, FILM COATED ORAL 2 TIMES DAILY
COMMUNITY

## 2021-01-01 RX ORDER — FENTANYL CITRATE-0.9 % NACL/PF 10 MCG/ML
25 PLASTIC BAG, INJECTION (ML) INTRAVENOUS CONTINUOUS
Status: DISCONTINUED | OUTPATIENT
Start: 2021-01-01 | End: 2021-01-01

## 2021-01-01 RX ORDER — OXYCODONE HYDROCHLORIDE 5 MG/1
2.5 TABLET ORAL EVERY 4 HOURS PRN
Status: DISCONTINUED | OUTPATIENT
Start: 2021-01-01 | End: 2021-01-01

## 2021-01-01 RX ORDER — GEMFIBROZIL 600 MG/1
600 TABLET, FILM COATED ORAL 2 TIMES DAILY
COMMUNITY

## 2021-01-01 RX ORDER — HEPARIN SODIUM 10000 [USP'U]/100ML
3-30 INJECTION, SOLUTION INTRAVENOUS
Status: DISCONTINUED | OUTPATIENT
Start: 2021-01-01 | End: 2021-01-01

## 2021-01-01 RX ORDER — MAGNESIUM SULFATE HEPTAHYDRATE 40 MG/ML
4 INJECTION, SOLUTION INTRAVENOUS ONCE
Status: COMPLETED | OUTPATIENT
Start: 2021-01-01 | End: 2021-01-01

## 2021-01-01 RX ORDER — OXYCODONE HYDROCHLORIDE 5 MG/1
5 TABLET ORAL EVERY 4 HOURS PRN
Status: DISCONTINUED | OUTPATIENT
Start: 2021-01-01 | End: 2021-01-01

## 2021-01-01 RX ORDER — FENTANYL CITRATE 50 UG/ML
100 INJECTION, SOLUTION INTRAMUSCULAR; INTRAVENOUS
Status: DISCONTINUED | OUTPATIENT
Start: 2021-01-01 | End: 2021-05-17 | Stop reason: HOSPADM

## 2021-01-01 RX ORDER — OXYBUTYNIN CHLORIDE 5 MG/1
5 TABLET, EXTENDED RELEASE ORAL DAILY
COMMUNITY

## 2021-01-01 RX ORDER — ASPIRIN 81 MG/1
81 TABLET, CHEWABLE ORAL DAILY
Status: DISCONTINUED | OUTPATIENT
Start: 2021-01-01 | End: 2021-01-01

## 2021-01-01 RX ORDER — SIMVASTATIN 20 MG
20 TABLET ORAL
COMMUNITY

## 2021-01-01 RX ORDER — EPINEPHRINE 0.1 MG/ML
SYRINGE (ML) INJECTION
Status: DISPENSED
Start: 2021-01-01 | End: 2021-01-01

## 2021-01-01 RX ORDER — METOPROLOL TARTRATE 50 MG/1
50 TABLET, FILM COATED ORAL EVERY 12 HOURS SCHEDULED
COMMUNITY

## 2021-01-01 RX ORDER — ETOMIDATE 2 MG/ML
20 INJECTION INTRAVENOUS ONCE
Status: COMPLETED | OUTPATIENT
Start: 2021-01-01 | End: 2021-01-01

## 2021-01-01 RX ORDER — LORAZEPAM 2 MG/ML
INJECTION INTRAMUSCULAR
Status: COMPLETED
Start: 2021-01-01 | End: 2021-01-01

## 2021-01-01 RX ORDER — POTASSIUM CHLORIDE 20MEQ/15ML
40 LIQUID (ML) ORAL ONCE
Status: COMPLETED | OUTPATIENT
Start: 2021-01-01 | End: 2021-01-01

## 2021-01-01 RX ORDER — ASPIRIN 81 MG/1
81 TABLET, CHEWABLE ORAL DAILY
COMMUNITY

## 2021-01-01 RX ORDER — MILRINONE LACTATE 0.2 MG/ML
0.25 INJECTION, SOLUTION INTRAVENOUS CONTINUOUS
Status: DISCONTINUED | OUTPATIENT
Start: 2021-01-01 | End: 2021-01-01

## 2021-01-01 RX ORDER — LIDOCAINE 50 MG/G
1 PATCH TOPICAL DAILY
Status: DISCONTINUED | OUTPATIENT
Start: 2021-01-01 | End: 2021-01-01

## 2021-01-01 RX ORDER — AMITRIPTYLINE HYDROCHLORIDE 25 MG/1
50 TABLET, FILM COATED ORAL 2 TIMES DAILY
COMMUNITY

## 2021-01-01 RX ORDER — PANTOPRAZOLE SODIUM 40 MG/1
40 TABLET, DELAYED RELEASE ORAL DAILY
COMMUNITY

## 2021-01-01 RX ORDER — OMEPRAZOLE 20 MG/1
20 CAPSULE, DELAYED RELEASE ORAL DAILY
COMMUNITY

## 2021-01-01 RX ORDER — LORAZEPAM 2 MG/ML
4 INJECTION INTRAMUSCULAR EVERY 4 HOURS PRN
Status: DISCONTINUED | OUTPATIENT
Start: 2021-01-01 | End: 2021-05-17 | Stop reason: HOSPADM

## 2021-01-01 RX ORDER — MIDAZOLAM HYDROCHLORIDE 2 MG/2ML
1 INJECTION, SOLUTION INTRAMUSCULAR; INTRAVENOUS ONCE
Status: DISCONTINUED | OUTPATIENT
Start: 2021-01-01 | End: 2021-01-01 | Stop reason: HOSPADM

## 2021-01-01 RX ORDER — EPINEPHRINE 1 MG/ML
INJECTION, SOLUTION, CONCENTRATE INTRAVENOUS
Status: DISPENSED
Start: 2021-01-01 | End: 2021-01-01

## 2021-01-01 RX ADMIN — SODIUM BICARBONATE 50 MEQ: 84 INJECTION, SOLUTION INTRAVENOUS at 13:05

## 2021-01-01 RX ADMIN — LEVETIRACETAM 500 MG: 100 INJECTION, SOLUTION INTRAVENOUS at 19:30

## 2021-01-01 RX ADMIN — SODIUM CHLORIDE, SODIUM GLUCONATE, SODIUM ACETATE, POTASSIUM CHLORIDE, MAGNESIUM CHLORIDE, SODIUM PHOSPHATE, DIBASIC, AND POTASSIUM PHOSPHATE 100 ML/HR: .53; .5; .37; .037; .03; .012; .00082 INJECTION, SOLUTION INTRAVENOUS at 04:54

## 2021-01-01 RX ADMIN — FENTANYL CITRATE 100 MCG: 50 INJECTION INTRAMUSCULAR; INTRAVENOUS at 17:58

## 2021-01-01 RX ADMIN — POTASSIUM CHLORIDE 40 MEQ: 20 SOLUTION ORAL at 09:34

## 2021-01-01 RX ADMIN — ASPIRIN 81 MG: 81 TABLET, CHEWABLE ORAL at 09:28

## 2021-01-01 RX ADMIN — FOMEPIZOLE 900 MG: 1 INJECTION, SOLUTION INTRAVENOUS at 12:20

## 2021-01-01 RX ADMIN — NOREPINEPHRINE BITARTRATE 12 MCG/MIN: 1 INJECTION, SOLUTION, CONCENTRATE INTRAVENOUS at 10:34

## 2021-01-01 RX ADMIN — SODIUM CHLORIDE, SODIUM GLUCONATE, SODIUM ACETATE, POTASSIUM CHLORIDE, MAGNESIUM CHLORIDE, SODIUM PHOSPHATE, DIBASIC, AND POTASSIUM PHOSPHATE 500 ML: .53; .5; .37; .037; .03; .012; .00082 INJECTION, SOLUTION INTRAVENOUS at 00:10

## 2021-01-01 RX ADMIN — ALBUMIN (HUMAN) 12.5 G: 12.5 INJECTION, SOLUTION INTRAVENOUS at 12:27

## 2021-01-01 RX ADMIN — FENTANYL CITRATE 50 MCG: 0.05 INJECTION, SOLUTION INTRAMUSCULAR; INTRAVENOUS at 13:14

## 2021-01-01 RX ADMIN — Medication 20 MG: at 17:45

## 2021-01-01 RX ADMIN — LIDOCAINE 1 PATCH: 50 PATCH TOPICAL at 09:29

## 2021-01-01 RX ADMIN — DOCUSATE SODIUM AND SENNOSIDES 1 TABLET: 8.6; 5 TABLET ORAL at 09:28

## 2021-01-01 RX ADMIN — HEPARIN SODIUM 9 UNITS/KG/HR: 10000 INJECTION, SOLUTION INTRAVENOUS at 07:34

## 2021-01-01 RX ADMIN — Medication 100 MEQ: at 14:54

## 2021-01-01 RX ADMIN — MILRINONE LACTATE IN DEXTROSE 0.25 MCG/KG/MIN: 200 INJECTION, SOLUTION INTRAVENOUS at 15:26

## 2021-01-01 RX ADMIN — SODIUM BICARBONATE 50 MEQ: 84 INJECTION INTRAVENOUS at 11:50

## 2021-01-01 RX ADMIN — Medication 50 MCG/HR: at 09:10

## 2021-01-01 RX ADMIN — LIDOCAINE HYDROCHLORIDE 7 ML: 10 INJECTION, SOLUTION EPIDURAL; INFILTRATION; INTRACAUDAL; PERINEURAL at 08:35

## 2021-01-01 RX ADMIN — SODIUM CHLORIDE, SODIUM GLUCONATE, SODIUM ACETATE, POTASSIUM CHLORIDE, MAGNESIUM CHLORIDE, SODIUM PHOSPHATE, DIBASIC, AND POTASSIUM PHOSPHATE 250 ML: .53; .5; .37; .037; .03; .012; .00082 INJECTION, SOLUTION INTRAVENOUS at 16:50

## 2021-01-01 RX ADMIN — NOREPINEPHRINE BITARTRATE 20 MCG/MIN: 1 INJECTION, SOLUTION, CONCENTRATE INTRAVENOUS at 07:34

## 2021-01-01 RX ADMIN — INSULIN LISPRO 1 UNITS: 100 INJECTION, SOLUTION INTRAVENOUS; SUBCUTANEOUS at 18:49

## 2021-01-01 RX ADMIN — CHLORHEXIDINE GLUCONATE 0.12% ORAL RINSE 15 ML: 1.2 LIQUID ORAL at 09:29

## 2021-01-01 RX ADMIN — DOCUSATE SODIUM AND SENNOSIDES 1 TABLET: 8.6; 5 TABLET ORAL at 17:16

## 2021-01-01 RX ADMIN — NOREPINEPHRINE BITARTRATE 15 MCG/MIN: 1 INJECTION, SOLUTION, CONCENTRATE INTRAVENOUS at 19:35

## 2021-01-01 RX ADMIN — Medication 100 MG: at 08:00

## 2021-01-01 RX ADMIN — POTASSIUM CHLORIDE 40 MEQ: 29.8 INJECTION, SOLUTION INTRAVENOUS at 15:08

## 2021-01-01 RX ADMIN — SODIUM CHLORIDE, SODIUM GLUCONATE, SODIUM ACETATE, POTASSIUM CHLORIDE, MAGNESIUM CHLORIDE, SODIUM PHOSPHATE, DIBASIC, AND POTASSIUM PHOSPHATE 500 ML: .53; .5; .37; .037; .03; .012; .00082 INJECTION, SOLUTION INTRAVENOUS at 02:45

## 2021-01-01 RX ADMIN — SODIUM CHLORIDE 100 ML/HR: 9 INJECTION, SOLUTION INTRAVENOUS at 08:36

## 2021-01-01 RX ADMIN — CALCIUM GLUCONATE 2 G: 20 INJECTION, SOLUTION INTRAVENOUS at 11:20

## 2021-01-01 RX ADMIN — CALCIUM GLUCONATE 1 G: 20 INJECTION, SOLUTION INTRAVENOUS at 16:26

## 2021-01-01 RX ADMIN — SODIUM BICARBONATE 50 MEQ: 84 INJECTION INTRAVENOUS at 09:56

## 2021-01-01 RX ADMIN — PIPERACILLIN AND TAZOBACTAM 3.38 G: 36; 4.5 INJECTION, POWDER, FOR SOLUTION INTRAVENOUS at 00:40

## 2021-01-01 RX ADMIN — NOREPINEPHRINE BITARTRATE 25 MCG/MIN: 1 INJECTION INTRAVENOUS at 13:34

## 2021-01-01 RX ADMIN — LORAZEPAM 2 MG: 2 INJECTION INTRAMUSCULAR at 16:50

## 2021-01-01 RX ADMIN — DOCUSATE SODIUM AND SENNOSIDES 1 TABLET: 8.6; 5 TABLET ORAL at 17:08

## 2021-01-01 RX ADMIN — VASOPRESSIN 0.03 UNITS/MIN: 20 INJECTION INTRAVENOUS at 19:11

## 2021-01-01 RX ADMIN — SODIUM BICARBONATE 100 ML/HR: 84 INJECTION, SOLUTION INTRAVENOUS at 10:04

## 2021-01-01 RX ADMIN — CALCIUM GLUCONATE 2 G: 20 INJECTION, SOLUTION INTRAVENOUS at 05:26

## 2021-01-01 RX ADMIN — IODIXANOL 75 ML: 320 INJECTION, SOLUTION INTRAVASCULAR at 03:42

## 2021-01-01 RX ADMIN — NOREPINEPHRINE BITARTRATE 20 MCG/MIN: 1 INJECTION INTRAVENOUS at 12:39

## 2021-01-01 RX ADMIN — SODIUM CHLORIDE 1000 ML: 0.9 INJECTION, SOLUTION INTRAVENOUS at 12:26

## 2021-01-01 RX ADMIN — EPINEPHRINE 1 MG: 0.1 INJECTION, SOLUTION ENDOTRACHEAL; INTRACARDIAC; INTRAVENOUS at 11:38

## 2021-01-01 RX ADMIN — Medication 20 MG: at 05:57

## 2021-01-01 RX ADMIN — VANCOMYCIN HYDROCHLORIDE 1500 MG: 1 INJECTION, POWDER, LYOPHILIZED, FOR SOLUTION INTRAVENOUS at 17:29

## 2021-01-01 RX ADMIN — SODIUM BICARBONATE 50 MEQ: 84 INJECTION INTRAVENOUS at 16:49

## 2021-01-01 RX ADMIN — NOREPINEPHRINE BITARTRATE 30 MCG/MIN: 1 INJECTION, SOLUTION, CONCENTRATE INTRAVENOUS at 11:21

## 2021-01-01 RX ADMIN — SODIUM BICARBONATE 100 MEQ: 84 INJECTION INTRAVENOUS at 14:54

## 2021-01-01 RX ADMIN — SODIUM CHLORIDE, SODIUM GLUCONATE, SODIUM ACETATE, POTASSIUM CHLORIDE, MAGNESIUM CHLORIDE, SODIUM PHOSPHATE, DIBASIC, AND POTASSIUM PHOSPHATE 250 ML: .53; .5; .37; .037; .03; .012; .00082 INJECTION, SOLUTION INTRAVENOUS at 13:32

## 2021-01-01 RX ADMIN — SODIUM BICARBONATE 50 MEQ: 84 INJECTION INTRAVENOUS at 14:13

## 2021-01-01 RX ADMIN — FENTANYL CITRATE 50 MCG: 50 INJECTION INTRAMUSCULAR; INTRAVENOUS at 04:17

## 2021-01-01 RX ADMIN — METHYLENE BLUE 100 MG: 5 INJECTION INTRAVENOUS at 09:41

## 2021-01-01 RX ADMIN — IOHEXOL 40 ML: 350 INJECTION, SOLUTION INTRAVENOUS at 08:44

## 2021-01-01 RX ADMIN — SODIUM BICARBONATE 50 MEQ: 84 INJECTION INTRAVENOUS at 16:11

## 2021-01-01 RX ADMIN — NOREPINEPHRINE BITARTRATE 6 MCG/MIN: 1 INJECTION, SOLUTION, CONCENTRATE INTRAVENOUS at 15:16

## 2021-01-01 RX ADMIN — CHLORHEXIDINE GLUCONATE 15 ML: 1.2 SOLUTION ORAL at 08:15

## 2021-01-01 RX ADMIN — LORAZEPAM 4 MG: 2 INJECTION INTRAMUSCULAR; INTRAVENOUS at 19:15

## 2021-01-01 RX ADMIN — Medication 25 MCG/HR: at 22:11

## 2021-01-01 RX ADMIN — FENTANYL CITRATE 100 MCG: 50 INJECTION, SOLUTION INTRAMUSCULAR; INTRAVENOUS at 08:08

## 2021-01-01 RX ADMIN — ALBUMIN (HUMAN) 12.5 G: 12.5 INJECTION, SOLUTION INTRAVENOUS at 22:54

## 2021-01-01 RX ADMIN — NOREPINEPHRINE BITARTRATE 20 MCG/MIN: 1 INJECTION INTRAVENOUS at 13:24

## 2021-01-01 RX ADMIN — Medication 100 MEQ: at 08:00

## 2021-01-01 RX ADMIN — NOREPINEPHRINE BITARTRATE 20 MCG/MIN: 1 INJECTION INTRAVENOUS at 13:28

## 2021-01-01 RX ADMIN — SODIUM BICARBONATE 100 ML/HR: 84 INJECTION, SOLUTION INTRAVENOUS at 18:27

## 2021-01-01 RX ADMIN — PIPERACILLIN AND TAZOBACTAM 3.38 G: 36; 4.5 INJECTION, POWDER, FOR SOLUTION INTRAVENOUS at 00:34

## 2021-01-01 RX ADMIN — POTASSIUM CHLORIDE 40 MEQ: 29.8 INJECTION, SOLUTION INTRAVENOUS at 12:16

## 2021-01-01 RX ADMIN — PIPERACILLIN AND TAZOBACTAM 3.38 G: 36; 4.5 INJECTION, POWDER, FOR SOLUTION INTRAVENOUS at 08:29

## 2021-01-01 RX ADMIN — HEPARIN SODIUM 18 UNITS/KG/HR: 10000 INJECTION, SOLUTION INTRAVENOUS at 21:42

## 2021-01-01 RX ADMIN — CALCIUM GLUCONATE 1 G: 20 INJECTION, SOLUTION INTRAVENOUS at 15:38

## 2021-01-01 RX ADMIN — ASPIRIN 81 MG: 81 TABLET, CHEWABLE ORAL at 08:15

## 2021-01-01 RX ADMIN — CALCIUM CHLORIDE 1 G: 100 INJECTION INTRAVENOUS; INTRAVENTRICULAR at 08:36

## 2021-01-01 RX ADMIN — INSULIN LISPRO 1 UNITS: 100 INJECTION, SOLUTION INTRAVENOUS; SUBCUTANEOUS at 17:21

## 2021-01-01 RX ADMIN — DOCUSATE SODIUM AND SENNOSIDES 1 TABLET: 8.6; 5 TABLET ORAL at 17:40

## 2021-01-01 RX ADMIN — CHLORHEXIDINE GLUCONATE 15 ML: 1.2 SOLUTION ORAL at 21:49

## 2021-01-01 RX ADMIN — Medication 20000 ML: at 18:18

## 2021-01-01 RX ADMIN — HYDROCORTISONE SODIUM SUCCINATE 50 MG: 100 INJECTION, POWDER, FOR SOLUTION INTRAMUSCULAR; INTRAVENOUS at 09:27

## 2021-01-01 RX ADMIN — ETOMIDATE 20 MG: 20 INJECTION, SOLUTION INTRAVENOUS at 08:00

## 2021-01-01 RX ADMIN — PROPOFOL 10 MCG/KG/MIN: 10 INJECTION, EMULSION INTRAVENOUS at 18:00

## 2021-01-01 RX ADMIN — VASOPRESSIN 0.04 UNITS/MIN: 20 INJECTION INTRAVENOUS at 15:23

## 2021-01-01 RX ADMIN — HEPARIN SODIUM 4400 UNITS: 1000 INJECTION INTRAVENOUS; SUBCUTANEOUS at 21:46

## 2021-01-01 RX ADMIN — VASOPRESSIN 0.04 UNITS/MIN: 20 INJECTION INTRAVENOUS at 19:26

## 2021-01-01 RX ADMIN — NOREPINEPHRINE BITARTRATE 10 MCG/MIN: 1 INJECTION INTRAVENOUS at 12:15

## 2021-01-01 RX ADMIN — MILRINONE LACTATE IN DEXTROSE 0.25 MCG/KG/MIN: 200 INJECTION, SOLUTION INTRAVENOUS at 23:18

## 2021-01-01 RX ADMIN — MAGNESIUM SULFATE HEPTAHYDRATE 4 G: 40 INJECTION, SOLUTION INTRAVENOUS at 15:10

## 2021-01-01 RX ADMIN — SODIUM CHLORIDE, SODIUM GLUCONATE, SODIUM ACETATE, POTASSIUM CHLORIDE, MAGNESIUM CHLORIDE, SODIUM PHOSPHATE, DIBASIC, AND POTASSIUM PHOSPHATE 500 ML: .53; .5; .37; .037; .03; .012; .00082 INJECTION, SOLUTION INTRAVENOUS at 22:06

## 2021-01-01 RX ADMIN — Medication 20000 ML: at 06:08

## 2021-01-01 RX ADMIN — LEVETIRACETAM 500 MG: 100 INJECTION, SOLUTION INTRAVENOUS at 21:01

## 2021-01-01 RX ADMIN — MIDAZOLAM 1 MG: 1 INJECTION INTRAMUSCULAR; INTRAVENOUS at 13:15

## 2021-01-01 RX ADMIN — DOCUSATE SODIUM AND SENNOSIDES 1 TABLET: 8.6; 5 TABLET ORAL at 08:15

## 2021-01-01 RX ADMIN — NOREPINEPHRINE BITARTRATE 12 MCG/MIN: 1 INJECTION INTRAVENOUS at 12:25

## 2021-01-01 RX ADMIN — SODIUM BICARBONATE 100 ML/HR: 84 INJECTION, SOLUTION INTRAVENOUS at 22:46

## 2021-01-01 RX ADMIN — Medication 20 MG: at 09:29

## 2021-01-01 RX ADMIN — INSULIN LISPRO 1 UNITS: 100 INJECTION, SOLUTION INTRAVENOUS; SUBCUTANEOUS at 00:04

## 2021-01-01 RX ADMIN — HYDROCORTISONE SODIUM SUCCINATE 50 MG: 100 INJECTION, POWDER, FOR SOLUTION INTRAMUSCULAR; INTRAVENOUS at 17:06

## 2021-01-01 RX ADMIN — PIPERACILLIN AND TAZOBACTAM 3.38 G: 36; 4.5 INJECTION, POWDER, FOR SOLUTION INTRAVENOUS at 22:43

## 2021-01-01 RX ADMIN — LEVETIRACETAM 500 MG: 100 INJECTION, SOLUTION INTRAVENOUS at 09:34

## 2021-01-01 RX ADMIN — VASOPRESSIN 0.04 UNITS/MIN: 20 INJECTION INTRAVENOUS at 07:14

## 2021-01-01 RX ADMIN — SODIUM BICARBONATE 50 MEQ: 84 INJECTION INTRAVENOUS at 13:33

## 2021-01-01 RX ADMIN — EPINEPHRINE 1 MG: 0.1 INJECTION, SOLUTION ENDOTRACHEAL; INTRACARDIAC; INTRAVENOUS at 12:06

## 2021-01-01 RX ADMIN — ASPIRIN 81 MG: 81 TABLET, CHEWABLE ORAL at 17:40

## 2021-01-01 RX ADMIN — NOREPINEPHRINE BITARTRATE 7 MCG/MIN: 1 INJECTION, SOLUTION, CONCENTRATE INTRAVENOUS at 22:59

## 2021-01-01 RX ADMIN — Medication 50 MEQ: at 16:49

## 2021-01-01 RX ADMIN — EPINEPHRINE 1 MG: 0.1 INJECTION, SOLUTION ENDOTRACHEAL; INTRACARDIAC; INTRAVENOUS at 12:25

## 2021-01-01 RX ADMIN — METHYLENE BLUE 100 MG: 5 INJECTION INTRAVENOUS at 15:04

## 2021-01-01 RX ADMIN — EPINEPHRINE 2 MCG/MIN: 1 INJECTION, SOLUTION, CONCENTRATE INTRAVENOUS at 15:16

## 2021-01-01 RX ADMIN — PIPERACILLIN AND TAZOBACTAM 3.38 G: 36; 4.5 INJECTION, POWDER, FOR SOLUTION INTRAVENOUS at 16:22

## 2021-01-01 RX ADMIN — SODIUM BICARBONATE 100 MEQ: 84 INJECTION INTRAVENOUS at 08:00

## 2021-01-01 RX ADMIN — SODIUM BICARBONATE 50 MEQ: 84 INJECTION INTRAVENOUS at 11:15

## 2021-01-01 RX ADMIN — PIPERACILLIN AND TAZOBACTAM 3.38 G: 36; 4.5 INJECTION, POWDER, FOR SOLUTION INTRAVENOUS at 16:50

## 2021-01-01 RX ADMIN — SODIUM BICARBONATE 100 ML/HR: 84 INJECTION, SOLUTION INTRAVENOUS at 11:56

## 2021-01-01 RX ADMIN — EPINEPHRINE 1 MG: 0.1 INJECTION, SOLUTION ENDOTRACHEAL; INTRACARDIAC; INTRAVENOUS at 12:41

## 2021-01-01 RX ADMIN — NOREPINEPHRINE BITARTRATE 5 MCG/MIN: 1 INJECTION INTRAVENOUS at 12:06

## 2021-01-01 RX ADMIN — PIPERACILLIN AND TAZOBACTAM 3.38 G: 36; 4.5 INJECTION, POWDER, FOR SOLUTION INTRAVENOUS at 09:34

## 2021-01-01 RX ADMIN — FENTANYL CITRATE 100 MCG: 50 INJECTION, SOLUTION INTRAMUSCULAR; INTRAVENOUS at 17:58

## 2021-01-01 RX ADMIN — SODIUM CHLORIDE, SODIUM GLUCONATE, SODIUM ACETATE, POTASSIUM CHLORIDE, MAGNESIUM CHLORIDE, SODIUM PHOSPHATE, DIBASIC, AND POTASSIUM PHOSPHATE 500 ML: .53; .5; .37; .037; .03; .012; .00082 INJECTION, SOLUTION INTRAVENOUS at 18:06

## 2021-01-01 RX ADMIN — LORAZEPAM 2 MG: 2 INJECTION INTRAMUSCULAR; INTRAVENOUS at 16:50

## 2021-01-01 RX ADMIN — NOREPINEPHRINE BITARTRATE 30 MCG/MIN: 1 INJECTION, SOLUTION, CONCENTRATE INTRAVENOUS at 10:22

## 2021-01-01 RX ADMIN — EPINEPHRINE 1 MG: 0.1 INJECTION, SOLUTION ENDOTRACHEAL; INTRACARDIAC; INTRAVENOUS at 11:52

## 2021-01-01 RX ADMIN — CALCIUM GLUCONATE 2 G: 20 INJECTION, SOLUTION INTRAVENOUS at 01:16

## 2021-01-01 RX ADMIN — VASOPRESSIN 0.04 UNITS/MIN: 20 INJECTION INTRAVENOUS at 13:11

## 2021-01-01 RX ADMIN — LIDOCAINE 1 PATCH: 50 PATCH TOPICAL at 10:34

## 2021-01-01 RX ADMIN — LEVETIRACETAM 500 MG: 100 INJECTION, SOLUTION INTRAVENOUS at 08:47

## 2021-01-01 RX ADMIN — NOREPINEPHRINE BITARTRATE 30 MCG/MIN: 1 INJECTION, SOLUTION, CONCENTRATE INTRAVENOUS at 15:27

## 2021-05-14 PROBLEM — N39.0 UTI (URINARY TRACT INFECTION): Status: ACTIVE | Noted: 2021-01-01

## 2021-05-14 PROBLEM — I10 HYPERTENSION: Chronic | Status: ACTIVE | Noted: 2021-01-01

## 2021-05-14 PROBLEM — J96.01 ACUTE RESPIRATORY FAILURE WITH HYPOXIA (HCC): Status: ACTIVE | Noted: 2021-01-01

## 2021-05-14 PROBLEM — G40.909 EPILEPSY (HCC): Status: ACTIVE | Noted: 2021-01-01

## 2021-05-14 PROBLEM — G40.909 EPILEPSY (HCC): Chronic | Status: ACTIVE | Noted: 2021-01-01

## 2021-05-14 PROBLEM — E87.2 METABOLIC ACIDOSIS: Status: ACTIVE | Noted: 2021-01-01

## 2021-05-14 PROBLEM — R77.8 ELEVATED TROPONIN: Status: ACTIVE | Noted: 2021-01-01

## 2021-05-14 PROBLEM — R79.89 POSITIVE D DIMER: Status: ACTIVE | Noted: 2021-01-01

## 2021-05-14 PROBLEM — D64.9 ANEMIA: Status: ACTIVE | Noted: 2021-01-01

## 2021-05-14 PROBLEM — F32.A DEPRESSION: Chronic | Status: ACTIVE | Noted: 2021-01-01

## 2021-05-14 PROBLEM — I25.10 CAD (CORONARY ARTERY DISEASE): Chronic | Status: ACTIVE | Noted: 2021-01-01

## 2021-05-14 PROBLEM — R57.9 SHOCK (HCC): Status: ACTIVE | Noted: 2021-01-01

## 2021-05-14 PROBLEM — E11.9 TYPE 2 DIABETES MELLITUS (HCC): Chronic | Status: ACTIVE | Noted: 2021-01-01

## 2021-05-14 PROBLEM — I46.9 CARDIAC ARREST (HCC): Status: ACTIVE | Noted: 2021-01-01

## 2021-05-14 PROBLEM — E11.9 TYPE 2 DIABETES MELLITUS (HCC): Status: ACTIVE | Noted: 2021-01-01

## 2021-05-14 PROBLEM — I49.9 ARRHYTHMIA: Status: ACTIVE | Noted: 2021-01-01

## 2021-05-14 PROBLEM — E87.2 LACTIC ACIDOSIS: Status: ACTIVE | Noted: 2021-01-01

## 2021-05-14 PROBLEM — G93.40 ENCEPHALOPATHY ACUTE: Status: ACTIVE | Noted: 2021-01-01

## 2021-05-14 PROBLEM — N17.9 AKI (ACUTE KIDNEY INJURY) (HCC): Status: ACTIVE | Noted: 2021-01-01

## 2021-05-14 NOTE — RESPIRATORY THERAPY NOTE
RT Protocol Note  Marj Bailon 79 y o  female MRN: [id number]  Unit/Bed#: [facility] 337-55 Encounter: [id number]    Assessment    Active Problems:    * No active hospital problems   *      Home Pulmonary Medications:         Past Medical History:   Diagnosis Date    CAD (coronary artery disease), native coronary artery 2007    stent and CABG x 3     Cervical spinal stenosis     Gastric ulcer     GERD (gastroesophageal reflux disease)     HTN (hypertension)      Social History     Socioeconomic History    Marital status:      Spouse name: Not on file    Number of children: Not on file    Years of education: Not on file    Highest education level: Not on file   Occupational History    Not on file   Social Needs    Financial resource strain: Not on file    Food insecurity     Worry: Not on file     Inability: Not on file    Transportation needs     Medical: Not on file     Non-medical: Not on file   Tobacco Use    Smoking status: Current Every Day Smoker    Smokeless tobacco: Never Used    Tobacco comment: unable to obtain full history due to acuity status   Substance and Sexual Activity    Alcohol use: Not on file     Comment: unable to obtain due to health status    Drug use: Not on file     Comment: unable to obtain due to health status    Sexual activity: Not on file     Comment: defer   Lifestyle    Physical activity     Days per week: Not on file     Minutes per session: Not on file    Stress: Not on file   Relationships    Social connections     Talks on phone: Not on file     Gets together: Not on file     Attends Hindu service: Not on file     Active member of club or organization: Not on file     Attends meetings of clubs or organizations: Not on file     Relationship status: Not on file    Intimate partner violence     Fear of current or ex partner: Not on file     Emotionally abused: Not on file     Physically abused: Not on file     Forced sexual activity: Not on file   Other Topics Concern    Not on file   Social History Narrative    Not on file       Subjective         Objective    Physical Exam:   Assessment Type: (P) Assess only  Bilateral Breath Sounds: (P) Clear, Diminished    Vitals:  Blood pressure (!) 87/71, pulse 60, temperature (!) 92 8 °F (33 8 °C), resp  rate 14, SpO2 (!) 76 %  Results from last 7 days   Lab Units 05/14/21  1210   PH ART  7 000*   PCO2 ART mm Hg 30 5*   PO2 ART mm Hg 261 6*   HCO3 ART mmol/L 7 4*   BASE EXC ART mmol/L -22 8   O2 CONTENT ART mL/dL 17 1   O2 HGB, ARTERIAL % 96 8   ABG SOURCE  Brachial, Left   CESIA TEST  Yes       Imaging and other studies: I have personally reviewed pertinent reports  O2 Device: (P) (Vent)     Plan    Respiratory Plan: (P) Vent/NIV/HFNC        Resp Comments: (P) Pt  arrived from [facility], pt  placed on vent  Pt post arrest  BS clear  No rx needed at this time  Will continue to monitor pt    Pt  Xray clear

## 2021-05-14 NOTE — EMTALA/ACUTE CARE TRANSFER
[address]  [facility] [address]  Dept: [phone]      EMTALA TRANSFER CONSENT    NAME Blu Houston                                         1950                              MRN [id number]    I have been informed of my rights regarding examination, treatment, and transfer   by Dr Gus Mckeon DO    Benefits: Specialized equipment and/or services available at the receiving facility (Include comment)________________________(Cardiac catheterization not available)    Risks:        Consent for Transfer:  I acknowledge that my medical condition has been evaluated and explained to me by the emergency department physician or other qualified medical person and/or my attending physician, who has recommended that I be transferred to the service of  Accepting Physician: Dr Tabatha Lucero at [address] [place] [address] Name, [address] : [facility]  The above potential benefits of such transfer, the potential risks associated with such transfer, and the probable risks of not being transferred have been explained to me, and I fully understand them  The doctor has explained that, in my case, the benefits of transfer outweigh the risks  I agree to be transferred  I authorize the performance of emergency medical procedures and treatments upon me in both transit and upon arrival at the receiving facility  Additionally, I authorize the release of any and all medical records to the receiving facility and request they be transported with me, if possible  I understand that the safest mode of transportation during a medical emergency is an ambulance and that the Hospital advocates the use of this mode of transport  Risks of traveling to the receiving facility by car, including absence of medical control, life sustaining equipment, such as oxygen, and medical personnel has been explained to me and I fully understand them      (JOESPH CORRECT BOX BELOW)  [  ]  I consent to the stated transfer and to be transported by ambulance/helicopter  [  ]  I consent to the stated transfer, but refuse transportation by ambulance and accept full responsibility for my transportation by car  I understand the risks of non-ambulance transfers and I exonerate the Hospital and its staff from any deterioration in my condition that results from this refusal     X___________________________________________    DATE  21  TIME________  Signature of patient or legally responsible individual signing on patient behalf           RELATIONSHIP TO PATIENT_________________________          Provider Certification    NAME Gregor Morel                                         1950                              MRN [id number]    A medical screening exam was performed on the above named patient  Based on the examination:    Condition Necessitating Transfer The primary encounter diagnosis was Cardiac arrest ([facility] [address] )  A diagnosis of STEMI (ST elevation myocardial infarction) ([facility] [address] ) was also pertinent to this visit  Patient Condition: An emergency transfer is being made prior to stabilization due to the need for definitive care and the benefit of transfer outweighs the risk    Reason for Transfer: Level of Care needed not available at this facility    Transfer Requirements: Facility [facility]   · Space available and qualified personnel available for treatment as acknowledged by    · Agreed to accept transfer and to provide appropriate medical treatment as acknowledged by       Dr Adria Henry  · Appropriate medical records of the examination and treatment of the patient are provided at the time of transfer   [address], [address] _______  · Transfer will be performed by qualified personnel from [facility]  and appropriate transfer equipment as required, including the use of necessary and appropriate life support measures      Provider Certification: I have examined the patient and explained the following risks and benefits of being transferred/refusing transfer to the patient/family:  General risk, such as traffic hazards, adverse weather conditions, rough terrain or turbulence, possible failure of equipment (including vehicle or aircraft), or consequences of actions of persons outside the control of the transport personnel, Unanticipated needs of medical equipment and personnel during transport, Risk of worsening condition, The possibility of a transport vehicle being unavailable      Based on these reasonable risks and benefits to the patient and/or the unborn child(criss), and based upon the information available at the time of the patients examination, I certify that the medical benefits reasonably to be expected from the provision of appropriate medical treatments at another medical facility outweigh the increasing risks, if any, to the individuals medical condition, and in the case of labor to the unborn child, from effecting the transfer      X____________________________________________ DATE 05/14/21        TIME_______      ORIGINAL - SEND TO MEDICAL RECORDS   COPY - SEND WITH PATIENT DURING TRANSFER

## 2021-05-14 NOTE — CONSULTS
Consult Cardiology    Malinda Malhotra 1950, 79 y o  female MRN: [id number]    Unit/Bed#: TR 13B Encounter: [id number]    Attending Provider: No att  providers found   Primary Care Provider: Ancelmo Pratt DO   Date admitted to hospital: 5/14/2021       Inpatient consult to Cardiology  Consult performed by: Harinder Hicks DO  Consult ordered by: Deniz Snider DO          * Cardiac arrest [facility])  Assessment & Plan  Pt presented unresponsive after receiving 1 hour of CPR and ACLS by EMS  Known CAD with CABG x 3  EKG concerning for possible STEMI  EKG changes could also be related to high dose epinephrine and levophed as well as CPR  Patient was stabilized and decision was made to transfer to cath-capable facility in the event cardiac catheterization would be indicated  Discussed with Dr Bright Dexter and the patient will be evaluated by cardiology upon arrival for further determination of course of action  Physician Requesting Consult: No att  providers found    Reason for Consult / Principal Problem: cardiac arrest          HPI: Malinda Malhotra is a 79y o  year old female who has a history of CAD s/p inferior wall myocardial infarction in 2007, treated initially with stent and later CABG x 3, HTN, GERD with gastric ulcer, cervical spinal stenosis who follows with [address]  Patient presents with pre-hospital cardiac arrest  EMS found her on the toilet unresponsive and without a pulse  She received approximately 1 hour of CPR pre-hospital  She continued to receive CPR and an additional dose of epinephrine upon arrival to the ED  Spontaneous circulation was restored  Critical care and cardiology were consulted upon arrival  The patient had been started on Levophed and Epinephrine drip with a blood pressure in the 46'E systolic  Initial rhythm was difficult to discern in the setting of frequent ectopy   There were episodes of PAC's, PVC's and occasional periods that appeared to be advanced heart block  ST elevation was noted on initial EKG in the inferior and anterior leads  Arterial line was placed by critical care attending  Drips were escalated  Patient was noted to have purposeful movements of her arms  Bedside echocardiogram was performed by critical care which showed dilated RV with decreased RV function  D shaped ventricular septum in systole and diastole with small pericardial effusion, as well as left ventricular hypertrophy  Blood pressure stabilized and actually the patient became hypertensive at which point Levophed and Epinephrine were down - titrated  Critical care team in [place] as well as cardiology were contacted for possible transfer  Patient was stabilized and the decision was made to transfer via helicopter          Historical Information       Past Medical History:   Diagnosis Date    CAD (coronary artery disease), native coronary artery 2007    stent and CABG x 3     Cervical spinal stenosis     Gastric ulcer     GERD (gastroesophageal reflux disease)     HTN (hypertension)      Past Surgical History:   Procedure Laterality Date    CORONARY ARTERY BYPASS GRAFT  2007    x3, using artery graft performed by Dr Marci Daigle at [facility]     Social History     Substance and Sexual Activity   Alcohol Use None    Comment: unable to obtain due to health status     Social History     Substance and Sexual Activity   Drug Use Not on file    Comment: unable to obtain due to health status     Social History     Tobacco Use   Smoking Status Current Every Day Smoker   Smokeless Tobacco Never Used   Tobacco Comment    unable to obtain full history due to acuity status       Family History:   Family History   Problem Relation Age of Onset    Diabetes Mother     Cardiomyopathy Mother 36    Intracerebral hemorrhage Father 36    Hypertension Brother     Cardiomyopathy Brother 43        received cardiac transplant at age 43    Hypertension Brother     Coronary artery disease Brother 54       Meds/Allergies      Medication allergies obtained from chart review:  -- Morphine (Morphine Sulfate) -- Psych complications  -- hallucinations  -- Cold (Phenylpropanolamine Hcl) -- Tachycardia    Home medication list copied from chart review:  AMLODIPINE BESYLATE 5 MG PO TABS 1 TABLET DAILY   NITROGLYCERIN 0 4 MG SL SUBL prn as needed for chest pain   PROTONIX 40 MG PO TBEC 1 tab daily   METOPROLOL XL TBCR 50 MG OR take one tablet two times daily 204 3   HYDROCHLOROTHIAZIDE 25 MG PO TABS take one tablet daily 34 11   ZOCOR 20 MG PO TABS take one tablet by mouth daily at bedtime 0 0   PLAVIX 75 MG PO TABS one tablet by mouth daily 0 0   ASPIRIN 81 MG PO CAPS None Entered     Amitriptyline 25 AM, 50 pm with pain relief x 6 mo       current meds:   No current facility-administered medications for this encounter        Facility-Administered Medications Ordered in Other Encounters   Medication Dose Route Frequency    aspirin chewable tablet 81 mg  81 mg Oral Daily    chlorhexidine (PERIDEX) 0 12 % oral rinse 15 mL  15 mL Mouth/Throat Q12H [address]    EPINEPHrine 3000 mcg (STANDARD CONCENTRATION) IV in sodium chloride 0 9% 250 mL  1-10 mcg/min Intravenous Titrated    insulin lispro (HumaLOG) 100 units/mL subcutaneous injection 1-5 Units  1-5 Units Subcutaneous Q6H [address]    levETIRAcetam (KEPPRA) 500 mg in sodium chloride 0 9 % 100 mL IVPB  500 mg Intravenous Q12H [address]    magnesium sulfate 4 g/100 mL IVPB (premix) 4 g  4 g Intravenous Once    norepinephrine (LEVOPHED) 4 mg (STANDARD CONCENTRATION) IV in sodium chloride 0 9% 250 mL  1-30 mcg/min Intravenous Titrated    omeprazole (PRILOSEC) suspension 2 mg/mL  20 mg Oral Early Morning    piperacillin-tazobactam (ZOSYN) 3 375 g in sodium chloride 0 9 % 100 mL IVPB  3 375 g Intravenous Once    [START ON 5/15/2021] piperacillin-tazobactam (ZOSYN) 3 375 g in sodium chloride 0 9 % 100 mL IVPB  3 375 g Intravenous Q8H    potassium chloride 40 mEq IVPB (premix) 40 mEq Intravenous Once    senna-docusate sodium (SENOKOT S) 8 6-50 mg per tablet 1 tablet  1 tablet Oral BID    sodium bicarbonate 150 mEq in dextrose 5 % 1,000 mL infusion  100 mL/hr Intravenous Continuous    sodium bicarbonate 8 4 % injection **ADS Override Pull**        sodium bicarbonate 8 4 % injection 50 mEq  50 mEq Intravenous Once     No current facility-administered medications for this encounter  Not on File    Objective     Vitals: Blood pressure 147/76, pulse 82, temperature (!) 96 5 °F (35 8 °C), temperature source Temporal, resp  rate 18, weight 56 4 kg (124 lb 5 4 oz), SpO2 (!) 79 %  , There is no height or weight on file to calculate BMI  Orthostatic Blood Pressures      Most Recent Value   Blood Pressure  147/76 filed at 05/14/2021 1257   Patient Position - Orthostatic VS  Lying filed at 05/14/2021 8599          Systolic (54DAC), FJH:928 , Min:67 , FZV:569     Diastolic (58RJL), UOZ:06, Min:43, Max:102      No intake or output data in the 24 hours ending 05/14/21 1632    Weight (last 2 days) before discharge     Date/Time   Weight    05/14/21 1150   56 4 (124 34)              Invasive Devices     Central Venous Catheter Line            CVC Central Lines 05/14/21 Triple Right Subclavian less than 1 day          Peripheral Intravenous Line            Peripheral IV 05/14/21 Right Antecubital less than 1 day    Peripheral IV 05/14/21 Right Hand less than 1 day          Arterial Line            Arterial Line 05/14/21 Left Radial less than 1 day          Drain            NG/OG/Enteral Tube Orogastric 18 Fr Center mouth less than 1 day    Urethral Catheter Temperature probe 16 Fr  less than 1 day          Airway            ETT  Cuffed 7 mm less than 1 day    ETT  Oral 7 mm less than 1 day                Physical Exam  Constitutional:       General: She is in acute distress  Appearance: She is ill-appearing  She is not diaphoretic  Interventions: She is intubated     Cardiovascular: Rate and Rhythm: Rhythm irregularly irregular  Comments: Intermittent tachycardia, distant heart sounds  Pulmonary:      Effort: She is intubated  Abdominal:      General: Abdomen is flat  Skin:     General: Skin is cool and dry  Coloration: Skin is pale  Neurological:      Mental Status: She is unresponsive  Laboratory Results:    Results from last 7 days   Lab Units 05/14/21  1527 05/14/21  1309   TROPONIN I ng/mL 2 48* 0 64*       CBC with diff:   Results from last 7 days   Lab Units 05/14/21  1527 05/14/21  1505 05/14/21  1309   WBC Thousand/uL 16 13*  --  17 57*   HEMOGLOBIN g/dL 11 3*  --  10 3*   I STAT HEMOGLOBIN g/dl  --  11 2*  --    HEMATOCRIT % 35 8  --  31 9*   HEMATOCRIT, ISTAT %  --  33*  --    MCV fL 101*  --  100*   PLATELETS Thousands/uL 204  --  187   MCH pg 31 8  --  32 2   MCHC g/dL 31 6  --  32 3   RDW % 14 8  --  14 6   MPV fL 10 5  --  10 4   NRBC AUTO /100 WBCs 0  --   --          CMP:  Results from last 7 days   Lab Units 05/14/21  1527 05/14/21  1505 05/14/21  1309   POTASSIUM mmol/L 3 9  --  3 6   CHLORIDE mmol/L 118*  --  116*   CO2 mmol/L 13*  --  17*   CO2, I-STAT mmol/L  --  18*  --    BUN mg/dL 32*  --  27*   CREATININE mg/dL 1 68*  --  1 72*   GLUCOSE, ISTAT mg/dl  --  208*  --    CALCIUM mg/dL 8 1*  --  7 2*   AST U/L  --   --  253*   ALT U/L  --   --  125*   ALK PHOS U/L  --   --  98   EGFR ml/min/1 73sq m 31  --  30       BMP:  Results from last 7 days   Lab Units 05/14/21  1527 05/14/21  1505 05/14/21  1309   POTASSIUM mmol/L 3 9  --  3 6   CHLORIDE mmol/L 118*  --  116*   CO2 mmol/L 13*  --  17*   CO2, I-STAT mmol/L  --  18*  --    BUN mg/dL 32*  --  27*   CREATININE mg/dL 1 68*  --  1 72*   GLUCOSE, ISTAT mg/dl  --  208*  --    CALCIUM mg/dL 8 1*  --  7 2*       BNP:  No results for input(s): BNP in the last 72 hours      Magnesium:   Results from last 7 days   Lab Units 05/14/21  1527 05/14/21  1309   MAGNESIUM mg/dL 2 1 1 7       Coags: Results from last 7 days   Lab Units 05/14/21  1527 05/14/21  1309   PTT seconds 32 35   INR  1 36* 1 49*       Cardiac testing:     Echocardiogram dated 4/16/2021:  Result Narrative     Right Ventricle: Right ventricle cavity is normal     Right Ventricle: Systolic function is normal     Left Atrium: Left atrium cavity size is normal     Left Ventricle: Left ventricle is normal in size    Left Ventricle: There is mild concentric hypertrophy    Left Ventricle: Systolic function is severely decreased with an   ejection fraction of 30-35%    Left Ventricle: There is grade III (severe) diastolic dysfunction and   elevated left atrial pressure    Left Ventricle: Global hypokinesis    Mitral Valve: There is mild regurgitation    Tricuspid Valve: There is mild regurgitation    Pulmonic Valve: There is mild to moderate pulmonary hypertension    Pericardium: There is a small to moderate circumferential pericardial   effusion predominantly posterior to the heart  Maximum dimension of 1 0 cm    Pericardium: There is no echocardiographic evidence of tamponade  No previous echo images available to compare with  Given LVH, pericardial effusion severe diastolic dysfunction, pulmonary   hypertension, consider infiltrative cardiomyopathy such as cardiac   amyloidosis         Imaging: I have personally reviewed pertinent films in PACS    Xr Chest 1 View Portable    Result Date: 5/14/2021  Narrative: CHEST INDICATION:   sob  COMPARISON:  5/13/2021 EXAM PERFORMED/VIEWS:  XR CHEST PORTABLE Images: 2 FINDINGS: Sternal wires and cardiac clips consistent with CABG Typical endotracheal tube  Interval placement of right IJ CVP line, the tip projecting at the superior right atrium Cardiomediastinal silhouette remains mildly enlarged The lungs are clear  No pneumothorax or pleural effusion  Osseous structures appear within normal limits for patient age   Partially visualized multilevel cervical fixation hardware     Impression: Interval placement of typical right IJ CVP line with tip projecting at superior right atrium, without complication No acute cardiopulmonary disease  Status post CABG Workstation performed: [id number]     Xr Chest 1 View Portable    Result Date: 5/14/2021  Narrative: CHEST INDICATION:  Cardiac arrest  COMPARISON:  None EXAM PERFORMED/VIEWS:  XR CHEST PORTABLE  AP supine FINDINGS: ET tube tip identified approximately 2 5 cm above the donlad  Cardiomediastinal silhouette appears unremarkable  Status post median sternotomy with disruption of the lowest most sternal wire  The lungs are clear  No discernible pneumothorax or layering pleural effusion on limited supine imaging  Osseous structures appear within normal limits for patient age  Cervical fusion hardware  Impression: ET tube tip above the odnald  No acute cardiopulmonary disease  Workstation performed: [id number]       EKG reviewed personally: EKG: possible ST elevation in the anterolateral leads with undetermined rhythm  Counseling / Coordination of Care  Total floor / unit time spent today 60 minutes critical care time  1230pm I arrived in emergency department, and I left ED at 1:30pm  Greater than 50% of total time was spent with the patient and / or family counseling and / or coordination of care  A description of the counseling / coordination of care: Discussed with critical care and family  Discussed with interventional cardiology  Discussed with ED physician          Code Status: Level 1 - Full Code

## 2021-05-14 NOTE — ASSESSMENT & PLAN NOTE
Pt presented unresponsive after receiving 1 hour of CPR and ACLS by EMS  Known CAD with CABG x 3  EKG concerning for possible STEMI  EKG changes could also be related to high dose epinephrine and levophed as well as CPR  Patient was stabilized and decision was made to transfer to cath-capable facility in the event cardiac catheterization would be indicated  Discussed with Dr Marta Roberto and the patient will be evaluated by cardiology upon arrival for further determination of course of action

## 2021-05-14 NOTE — QUICK NOTE
I was called to ED for cardiac arrest for Devendra Brea  Upon arrival patient had faint pulses, hypotensive, was on vent and not making any purposeful movements  Patient had CPR for about 1 hour, hx 3 vessel CABG, passed out while going to the bathroom  I helped direct pressor management and place central access and a line  I performed a bedside TTE which was concerning for RWMA in septal, inferior and anterior walls  RV severely dilated with septal bowing  In conjunction with ECG findings this was concerning for occlusion  Rhythm was very difficult to analyze due to many PACs and PVCs  Patient was bradycardic to 40s prior to epi infusion  Although specific rhythm could not be identified I was concerned for intermittent HB  While placing central line patient had purposeful movements trying to reach for endotracheal tube  She did not open her eyes to voice nor followed commands  I consoled daughter and discussed [address] regarding the gravity of the situation and her mother's high likelihood of passing away, however that purposeful movements demonstrated that there could be a chance of meaningful neurologic recovery  I discussed case with Dr Megha Sharma given the possibility of a STEMI and possible IHB we felt the best plan of care would to go to a higher level of care with a cath lab facility to investigate possible STEMI and facility capable of placement of pacing device given possible heart block  Dr Megha Sharma discussed case with Dr Yodit Lewis and I discussed case with Dr Osvaldo Dumont the accepting critical care physician  I spent approximately 35 minutes of critical care time excluding procedures and discussing case with family members

## 2021-05-14 NOTE — ED PROVIDER NOTES
History  Chief Complaint   Patient presents with    Cardiac Arrest     Witnessed arrest - no CPR started - found by EMS slumped over on toilet     Patient is a 51-year-old female presents the emergency department as pre-hospital cardiac arrest she was reported to be found on the toilet by EMS unresponsive without a pulse  Patient had reported 6 minutes of down time without CPR prior to EMS arrival at which time ACLS was started  Patient had approximately 1 hour of CPR and ACLS treatment pre-hospital including 3 doses of epinephrine intubation and CPR  Upon arrival to the ED patient was given an additional dose of epinephrine CPR was continued on the initial pause and pulse check and rhythm check patient had return of spontaneous circulation and maintain a good pulse and blood pressure she was started on Levophed for low blood pressures additionally started on epinephrine drip and stabilized with the assistance of critical care and cardiology  History provided by:  EMS personnel and relative      None       No past medical history on file  No past surgical history on file  No family history on file  I have reviewed and agree with the history as documented  No existing history information found  No existing history information found  Social History     Tobacco Use    Smoking status: Not on file   Substance Use Topics    Alcohol use: Not on file    Drug use: Not on file       Review of Systems   Unable to perform ROS: Intubated   All other systems reviewed and are negative  Physical Exam  Physical Exam  Constitutional:       Interventions: She is intubated  HENT:      Head: Normocephalic and atraumatic  Nose: Nose normal       Mouth/Throat:      Mouth: Mucous membranes are moist       Pharynx: Oropharynx is clear  Eyes:      Conjunctiva/sclera: Conjunctivae normal    Cardiovascular:      Rate and Rhythm: Normal rate  Rhythm irregular  Heart sounds: Murmur present  Pulmonary:      Effort: She is intubated  Breath sounds: Examination of the right-lower field reveals decreased breath sounds  Examination of the left-lower field reveals decreased breath sounds  Decreased breath sounds present  Abdominal:      General: There is no distension  Palpations: Abdomen is soft  Musculoskeletal:         General: No deformity or signs of injury  Neurological:      Mental Status: She is unresponsive  GCS: GCS eye subscore is 1  GCS verbal subscore is 1  GCS motor subscore is 1           Vital Signs  ED Triage Vitals   Temperature Pulse Respirations Blood Pressure SpO2   05/14/21 1211 05/14/21 1150 05/14/21 1142 05/14/21 1142 05/14/21 1150   (!) 96 5 °F (35 8 °C) 62 12 (!) 190/101 (!) 56 %      Temp Source Heart Rate Source Patient Position - Orthostatic VS BP Location FiO2 (%)   05/14/21 1211 05/14/21 1142 05/14/21 1142 05/14/21 1142 --   Temporal Monitor Lying Left arm       Pain Score       --                  Vitals:    05/14/21 1249 05/14/21 1250 05/14/21 1257 05/14/21 1300   BP: (!) 87/62 (!) 87/62 147/76    Pulse: 73 64 74 82   Patient Position - Orthostatic VS:   Lying          Visual Acuity      ED Medications  Medications   norepinephrine (LEVOPHED) 4 mg (STANDARD CONCENTRATION) IV in sodium chloride 0 9% 250 mL (25 mcg/min Intravenous New Bag 5/14/21 1334)   propofol (DIPRIVAN) 1000 mg in 100 mL infusion (premix) **ADS Override Pull** (has no administration in time range)   EPINEPHrine 3000 mcg (STANDARD CONCENTRATION) IV in sodium chloride 0 9% 250 mL (has no administration in time range)   midazolam (VERSED) injection 1 mg (has no administration in time range)   EPINEPHrine (ADRENALIN) injection (1 mg Intravenous Given 5/14/21 1225)   sodium chloride 0 9 % bolus 1,000 mL (1,000 mL Intravenous New Bag 5/14/21 1226)   EPINEPHrine (ADRENALIN) injection (1 mg Intravenous Given 5/14/21 1241)   sodium bicarbonate 50 mEq/50 mL injection (50 mEq Intravenous Given 5/14/21 1305)   midazolam (VERSED) injection 1 mg (1 mg Intravenous Given 5/14/21 1315)   fentanyl citrate (PF) 100 MCG/2ML 50 mcg (50 mcg Intravenous Given 5/14/21 1314)   midazolam (VERSED) injection (1 mg Intravenous Given 5/14/21 1317)       Diagnostic Studies  Results Reviewed     Procedure Component Value Units Date/Time    Lactic acid [[id number]]  (Abnormal) Collected: 05/14/21 1309    Lab Status: Final result Specimen: Blood from Central Venous Line Updated: 05/14/21 1344     LACTIC ACID 6 5 mmol/L     Narrative:      Result may be elevated if tourniquet was used during collection      Lactic acid 2 Hours [[id number]]     Lab Status: No result Specimen: Blood     NT-BNP PRO [[id number]]  (Abnormal) Collected: 05/14/21 1309    Lab Status: Final result Specimen: Blood from Central Venous Line Updated: 05/14/21 1342     NT-proBNP 7,841 pg/mL     Comprehensive metabolic panel [[id number]]  (Abnormal) Collected: 05/14/21 1309    Lab Status: Final result Specimen: Blood from Central Venous Line Updated: 05/14/21 1342     Sodium 150 mmol/L      Potassium 3 6 mmol/L      Chloride 116 mmol/L      CO2 17 mmol/L      ANION GAP 17 mmol/L      BUN 27 mg/dL      Creatinine 1 72 mg/dL      Glucose 229 mg/dL      Calcium 7 2 mg/dL      Corrected Calcium 9 0 mg/dL       U/L       U/L      Alkaline Phosphatase 98 U/L      Total Protein 4 6 g/dL      Albumin 1 8 g/dL      Total Bilirubin 0 33 mg/dL      eGFR 30 ml/min/1 73sq m     Narrative:      [place] guidelines for Chronic Kidney Disease (CKD):     Stage 1 with normal or high GFR (GFR > 90 mL/min/1 73 square meters)    Stage 2 Mild CKD (GFR = 60-89 mL/min/1 73 square meters)    Stage 3A Moderate CKD (GFR = 45-59 mL/min/1 73 square meters)    Stage 3B Moderate CKD (GFR = 30-44 mL/min/1 73 square meters)    Stage 4 Severe CKD (GFR = 15-29 mL/min/1 73 square meters)    Stage 5 End Stage CKD (GFR <15 mL/min/1 73 square meters)  Note: GFR calculation is accurate only with a steady state creatinine    Magnesium [[id number]]  (Normal) Collected: 05/14/21 1309    Lab Status: Final result Specimen: Blood from Central Venous Line Updated: 05/14/21 1342     Magnesium 1 7 mg/dL     Troponin I [[id number]]  (Abnormal) Collected: 05/14/21 1309    Lab Status: Final result Specimen: Blood from Central Venous Line Updated: 05/14/21 1341     Troponin I 0 64 ng/mL     Novel Coronavirus (Covid-19),PCR [facility] - 2 Hour Stat [[id number]] Collected: 05/14/21 1334    Lab Status: In process Specimen: Nares from Nasopharyngeal Swab Updated: 05/14/21 1337    Protime-INR [[id number]]  (Abnormal) Collected: 05/14/21 1309    Lab Status: Final result Specimen: Blood from Central Venous Line Updated: 05/14/21 1335     Protime 17 7 seconds      INR 1 49    APTT [[id number]]  (Normal) Collected: 05/14/21 1309    Lab Status: Final result Specimen: Blood from Central Venous Line Updated: 05/14/21 1335     PTT 35 seconds     UA w Reflex to Microscopic w Reflex to Culture [[id number]]  (Abnormal) Collected: 05/14/21 1329    Lab Status: Final result Specimen: Urine, Indwelling Kohli Catheter Updated: 05/14/21 1334     Color, UA Yellow     Clarity, UA Cloudy     Specific Gravity, UA 1 025     pH, UA 6 0     Leukocytes, UA Large     Nitrite, UA Negative     Protein,  (2+) mg/dl      Glucose, UA Negative mg/dl      Ketones, UA Negative mg/dl      Urobilinogen, UA 0 2 E U /dl      Bilirubin, UA Negative     Blood, UA Moderate    Urine Microscopic [[id number]] Collected: 05/14/21 1329    Lab Status:  In process Specimen: Urine, Indwelling Kohli Catheter Updated: 05/14/21 1334    CBC and differential [[id number]]  (Abnormal) Collected: 05/14/21 1309    Lab Status: Final result Specimen: Blood from Central Venous Line Updated: 05/14/21 1323     WBC 17 57 Thousand/uL      RBC 3 20 Million/uL      Hemoglobin 10 3 g/dL      Hematocrit 31 9 %       fL      MCH 32 2 pg      MCHC 32 3 g/dL RDW 14 6 %      MPV 10 4 fL      Platelets 895 Thousands/uL     Narrative: This is an appended report  These results have been appended to a previously verified report  Manual Differential(PHLEBS Do Not Order) [[id number]] Collected: 05/14/21 1309    Lab Status: In process Specimen: Blood from Central Venous Line Updated: 05/14/21 1323    D-Dimer [[id number]] Collected: 05/14/21 1309    Lab Status: In process Specimen: Blood from Central Venous Line Updated: 05/14/21 1318    Blood culture #1 [[id number]] Collected: 05/14/21 1309    Lab Status: In process Specimen: Blood from Central Venous Line Updated: 05/14/21 1317    Blood gas, arterial [[id number]]  (Abnormal) Collected: 05/14/21 1210    Lab Status: Final result Specimen: Blood, Arterial from Brachial, Left Updated: 05/14/21 1221     pH, Arterial 7 000     PH ART TC 7 015     pCO2, Arterial 30 5 mm Hg      PCO2 (TC) Arterial 28 9 mm Hg      pO2, Arterial 261 6 mm Hg      PO2 (TC) Arterial 255 9 mm Hg      HCO3, Arterial 7 4 mmol/L      Base Excess, Arterial -22 8 mmol/L      O2 Content, Arterial 17 1 mL/dL      O2 HGB,Arterial  96 8 %      SOURCE Brachial, Left     CESIA TEST Yes     Temperature 96 5 Degrees Fehrenheit      AC Rate 18     Tidal Volume 420 ml      I-TIME 0 9     Inspired Air (FIO2) 100     PEEP 6    Blood culture #2 [[id number]]     Lab Status: No result Specimen: Blood                  XR chest 1 view portable   ED Interpretation by Dmitry Fuller DO (05/14 1982)   ET tube well positioned no pneumothorax      Final Result by Porter Hernandez DO (05/14 1337)      ET tube tip above the donald  No acute cardiopulmonary disease  Workstation performed: [id number]         XR chest 1 view portable    (Results Pending)              Procedures  ECG 12 Lead Documentation Only    Date/Time: 5/14/2021 12:08 PM  Performed by: Dmitry Fuller DO  Authorized by:  Dmitry Fuller DO     ECG reviewed by me, the ED Provider: yes Patient location:  ED  Previous ECG:     Comparison to cardiac monitor: Yes    Quality:     Tracing quality:  Limited by artifact  Rate:     ECG rate:  69    ECG rate assessment: normal    Rhythm:     Rhythm: sinus rhythm    QRS:     QRS axis:  Left    QRS intervals: Wide  Conduction:     Conduction: abnormal      Abnormal conduction: incomplete LBBB and non-specific intraventricular conduction delay    ST segments:     ST segments:  Abnormal    Elevation:  III, II, aVF, V2, V1, V3 and V4    CriticalCare Time  Performed by: Sherri Scott DO  Authorized by: Sherri Scott DO     Critical care provider statement:     Critical care time (minutes):  90    Critical care was necessary to treat or prevent imminent or life-threatening deterioration of the following conditions:  Cardiac failure and respiratory failure    Critical care was time spent personally by me on the following activities:  Obtaining history from patient or surrogate, development of treatment plan with patient or surrogate, discussions with consultants, evaluation of patient's response to treatment, examination of patient, ordering and performing treatments and interventions, ordering and review of laboratory studies, ventilator management, re-evaluation of patient's condition, ordering and review of radiographic studies and review of old charts             ED Course  ED Course as of May 14 1348   Fri May 14, 2021   1201 EKG obtained now while stabilizing patient consistent with ST-elevation MI in post cardiac arrest with Rosc texted to Cardiology on-call  2961 Spoke with Dr Kathy Araya interventional cardiology on-call reviewed case and findings in the emergency department and management thus far he recommends transferring to critical care does not feel that immediate cardiac catheterization indicated at this time          [address] with Dr Matilda Gonzalez critical care on-call as well as Dr Jacek Hagan cardiology on-call reviewed case and EKG results cardiology recommends transfer for STEMI alert patient's family wants everything done  65 Spoke with family in the ED patient's daughter updated of case and patient's critical status patient is a full code by her wishes  Discussed the need for transfer and offered options of Ian and [address] family elected [facility] in [place]  [address] with Dr J Luis Spencer critical care on-call reviewed case and findings in the emergency department and management thus far he accepts for transfer  1318 Critical care and cardiology Dr Destiny Serrano and [facility] have been in room with patient and family extensively discussing and caring for patient at the same time patient currently stabilizing patient having some movements  Upon further discussion between Cardiology and Critical Care teams here and at [facility] in [place] patient is ultimately accepted for transfer for evaluation of potential cardiac catheterization    Dr Christopher Shanks now accepts for transfer                                              MDM  Number of Diagnoses or Management Options  Cardiac arrest [facility]): new and requires workup  STEMI (ST elevation myocardial infarction) [facility]): new and requires workup     Amount and/or Complexity of Data Reviewed  Clinical lab tests: ordered and reviewed  Tests in the radiology section of CPT®: ordered and reviewed  Tests in the medicine section of CPT®: ordered and reviewed  Decide to obtain previous medical records or to obtain history from someone other than the patient: yes  Review and summarize past medical records: yes  Independent visualization of images, tracings, or specimens: yes    Risk of Complications, Morbidity, and/or Mortality  Presenting problems: moderate  Diagnostic procedures: moderate  Management options: moderate    Patient Progress  Patient progress: stable      Disposition  Final diagnoses:   Cardiac arrest ([facility] [address] )   STEMI (ST elevation myocardial infarction) [facility])     Time reflects when diagnosis was documented in both MDM as applicable and the Disposition within this note     Time User Action Codes Description Comment    5/14/2021 12:37 PM Pal Powers Add [I46 9] Cardiac arrest ([address] )     5/14/2021 12:38 PM Pal Powers Add [I21 3] STEMI (ST elevation myocardial infarction) [facility])       ED Disposition     ED Disposition Condition Date/Time Comment    Transfer to Another Facility-In Network  Fri May 14, 2021 12:38 PM Alberteen Range should be transferred out to B  MD Documentation      Most Recent Value   Patient Condition  An emergency transfer is being made prior to stabilization due to the need for definitive care and the benefit of transfer outweighs the risk   Reason for Transfer  Level of Care needed not available at this facility   Benefits of Transfer  Specialized equipment and/or services available at the receiving facility (Include comment)________________________ [Cardiac catheterization not available]   Accepting Physician  Dr Leiva Better Name, [address] by [employer] and Unit #)  Life flight   Sending MD  [facility]   Provider Certification  General risk, such as traffic hazards, adverse weather conditions, rough terrain or turbulence, possible failure of equipment (including vehicle or aircraft), or consequences of actions of persons outside the control of the transport personnel, Unanticipated needs of medical equipment and personnel during transport, Risk of worsening condition, The possibility of a transport vehicle being unavailable      RN Documentation      Most [facility] [address] Name, [address]   Transported by [employer] and Unit #)  Life flight      Follow-up Information    None         There are no discharge medications for this patient  No discharge procedures on file      PDMP Review     None          ED Provider  Electronically Signed by               Opal Goldberg DO  05/14/21 [address]

## 2021-05-14 NOTE — PROCEDURES
Central Line    Date/Time: 5/14/2021 4:11 PM  Performed by: Rosaura Duncan MD  Authorized by: Rosaura Duncan MD     Patient location:  ICU  Procedure performed by consultant: Martha Samano  Consent:     Consent obtained:  Emergent situation  Universal protocol:     Relevant documents present and verified: yes      Test results available and properly labeled: yes      Radiology Images displayed and confirmed  If images not available, report reviewed: yes      Required blood products, implants, devices, and special equipment available: yes      Site/side marked: yes      Immediately prior to procedure, a time out was called: yes      Patient identity confirmed: Anonymous protocol, patient vented/unresponsive  Pre-procedure details:     Hand hygiene: Hand hygiene performed prior to insertion      Sterile barrier technique: All elements of maximal sterile technique followed      Skin preparation:  2% chlorhexidine  Indications:     Central line indications: other (comment)      Central line indications comment:  Potential temporary cardiac pacing  Procedure details:     Location:  Right internal jugular    Vessel type: vein      Laterality:  Right    Approach: percutaneous technique used      Patient position:  Flat    Catheter type:  Single lumen    Catheter size:  6 Fr    Landmarks identified: yes      Ultrasound guidance: yes      Ultrasound image availability:  Images available in PACS    Sterile ultrasound techniques: Sterile gel and sterile probe covers were used      Manometry confirmation: no      Number of attempts:  1    Successful placement: yes    Post-procedure details:     Post-procedure:  Dressing applied and line sutured    Assessment:  Blood return through all ports and no pneumothorax on x-ray    Patient tolerance of procedure:   Tolerated well, no immediate complications      Rosaura Duncan MD  Pulmonary and Critical Care Fellow- PGY5  [facility]

## 2021-05-14 NOTE — RESPIRATORY THERAPY NOTE
RT Ventilator Management Note  Heaton Clarity 79 y o  female MRN: [id number]  Unit/Bed#:  13B Encounter: [id number]      Daily Screen       5/14/2021  1155             Patient safety screen outcome[de-identified]  Failed    Not Ready for Weaning due to[de-identified]  Underline problem not resolved            Physical Exam:   Assessment Type: Assess only  General Appearance: Unresponsive  Respiratory Pattern: Assisted  Chest Assessment: Chest expansion symmetrical  Bilateral Breath Sounds: Diminished  Cough: None  O2 Device: vent      Resp Comments: Flight crew at bedside, given vent report

## 2021-05-14 NOTE — PROCEDURES
POC DVT/PE US    Date/Time: 5/14/2021 4:19 PM  Performed by: Ez Spangler PA-C  Authorized by: Ez Spangler PA-C     Patient location:  ICU  Performed by:  NP/PA  Other Assisting Provider: Yes (comment) (Dr Jerry Wilkins)    Procedure details:     Exam Type:  Diagnostic    Indications comment:  Cardiac arrest     Assessment for:  DVT    Views obtained: common femoral vein, femoral vein, deep femoral vein (profunda) and popliteal vein      Image quality: diagnostic      Image availability:  Not saved  Findings:     Extremities evaluated:  Left lower extremity and right lower extremity    Right common femoral vein:  Compressible    Right femoral vein: compressible      Right deep femoral vein:  Compressible    Right popliteal vein:  Compressible    Left common femoral vein:  Compressible    Left femoral vein: compressible      Left deep femoral vein:  Compressible    Left popliteal vein:  Compressible  Interpretation:     DVT location:  None

## 2021-05-14 NOTE — H&P
H&P Exam - Critical Care   Na Nuno 79 y o  female MRN: [id number]  Unit/Bed#: MICU 09 Encounter: [id number]      -------------------------------------------------------------------------------------------------------------  Chief Complaint: Cardiac arrest    This is a 66-year-old female past medical history significant for previous history of coronary artery bypass graft in 2007, , GERD, CKD stage IIIB, hypertension, spinal stenosis, history of inferior myocardial infarction 2007 STEMI, tobacco abuse who presented to [facility] 05/14/2021 secondary to cardiac arrest   History is obtained primarily through chart review due to patient's intubated and sedated status  Patient apparently had a witnessed cardiac arrest while she was sitting on the toilet, no CPR was started by the family approximate down time was 6 minutes and she was found by EMS to be slumped over on the toilet  CPR was started pre arrival to the emergency department, on presentation to the emergency department, return of spontaneous circulation was obtained  EKG on arrival to the emergency department was notable for ST elevation meeting STEMI criteria in inferior leads with reciprocal changes in 1 and aVL  No previous EKGs were available for comparison  Patient labs were notable for a arterial blood gas with a pH of 7 0, CO2 of 28, and O2 saturation greater than 200  Bicarb was 7 4  There was a 22 base excess  CBC was notable for leukocytosis of 17, hemoglobin 10 3  INR is 1 49, troponin was 0 64  Interventional Cardiology was contacted from the emergency department who did not feel the patient was a suitable candidate for catheterization  Chest x-ray was obtained which showed ET tube tip above the donald  Patient had arterial line placed, also had a central venous catheter placed  She was given bicarbonate, started on Levophed, as well as an epinephrine drip    Patient was sedated and the ICU at [facility] was contacted and patient was life flighted there  History obtained from chart review and unobtainable from patient due to mental status   -------------------------------------------------------------------------------------------------------------  Assessment and Plan:    Neuro:    Diagnosis:  Acute encephalopathy  o Plan:  Likely secondary to cardiac arrest, patient is still intubated sedated      Diagnosis: ***  o Plan: ***      CV:    Diagnosis:  Cardiac arrest  o Plan:  Likely secondary to ST-elevation myocardial infarction  o Continue heparin drip  o Interventional cardiology consulted do not feel the patient is a suitable candidate for catheterization  o Aspirin  o Monitor on telemetry  o Continue epinephrine drip  o Obtain stat echo  o Potassium greater than 4 magnesium greater than 2   Diagnosis:  History of CABG  o Plan:  Lifetime asthma   Diagnosis: ***  o Plan: ***   Diagnosis: ***  o Plan: ***      Pulm:   Diagnosis:  Acute hypoxic respiratory failure  o Plan:  Secondary to cardiac arrest  o Obtain stat ABG  o    Diagnosis: ***  o Plan: ***      GI:    Diagnosis: ***  o Plan: ***   Diagnosis: ***  o Plan: ***      :    Diagnosis: ***  o Plan: ***   Diagnosis: ***  o Plan: ***      F/E/N:    Plan: ***      Heme/Onc:    Diagnosis: ***  o Plan: ***   Diagnosis: ***  o Plan: ***      Endo:    Diagnosis: ***  o Plan: ***   Diagnosis: ***  o Plan: ***      ID:    Diagnosis: ***  o Plan: ***   Diagnosis: ***  o Plan: ***      MSK/Skin:    Diagnosis: ***  o Plan: ***   Diagnosis: ***  o Plan: ***      Disposition: {CC Dispo Plan:21782}  Code Status: No Order  --------------------------------------------------------------------------------------------------------------    Review of systems was unable to be performed secondary to Intubated sedated    Physical Exam  Constitutional:       Comments: Intubated   Eyes:      Conjunctiva/sclera: Conjunctivae normal    Cardiovascular:      Rate and Rhythm: Normal rate and regular rhythm  Pulmonary:      Comments: Rhonchus lung sounds bilaterally  Abdominal:      Comments: Nondistended soft   Musculoskeletal:         General: No swelling  Skin:     Coloration: Skin is not jaundiced  Neurological:      Comments: Patient sedated, however she is GCS of 3 T   Psychiatric:      Comments: Intubated sedated       --------------------------------------------------------------------------------------------------------------  Vitals: There were no vitals filed for this visit  Temp  Min: 96 5 °F (35 8 °C)  Max: 96 5 °F (35 8 °C)        There is no height or weight on file to calculate BMI  N/A    Laboratory and Diagnostics:  Results from last 7 days   Lab Units 05/14/21  1309   WBC Thousand/uL 17 57*   HEMOGLOBIN g/dL 10 3*   HEMATOCRIT % 31 9*   PLATELETS Thousands/uL 187     Results from last 7 days   Lab Units 05/14/21  1309   SODIUM mmol/L 150*   POTASSIUM mmol/L 3 6   CHLORIDE mmol/L 116*   CO2 mmol/L 17*   ANION GAP mmol/L 17*   BUN mg/dL 27*   CREATININE mg/dL 1 72*   CALCIUM mg/dL 7 2*   GLUCOSE RANDOM mg/dL 229*   ALT U/L 125*   AST U/L 253*   ALK PHOS U/L 98   ALBUMIN g/dL 1 8*   TOTAL BILIRUBIN mg/dL 0 33     Results from last 7 days   Lab Units 05/14/21  1309   MAGNESIUM mg/dL 1 7      Results from last 7 days   Lab Units 05/14/21  1309   INR  1 49*   PTT seconds 35      Results from last 7 days   Lab Units 05/14/21  1309   TROPONIN I ng/mL 0 64*         ABG:  Results from last 7 days   Lab Units 05/14/21  1210   PH ART  7 000*   PCO2 ART mm Hg 30 5*   PO2 ART mm Hg 261 6*   HCO3 ART mmol/L 7 4*   BASE EXC ART mmol/L -22 8   ABG SOURCE  Brachial, Left     VBG:  Results from last 7 days   Lab Units 05/14/21  1210   ABG SOURCE  Brachial, Left           Micro:        EKG: EKG reviewed ST elevation in inferior leads with reciprocal changes  Imaging: I have personally reviewed pertinent reports        Historical Information   No past medical history on file   No past surgical history on file  Social History   Social History     Substance and Sexual Activity   Alcohol Use Not on file     Social History     Substance and Sexual Activity   Drug Use Not on file     Social History     Tobacco Use   Smoking Status Not on file     Exercise History:   Family History:   No family history on file  Family history unknown      Medications:  No current facility-administered medications for this encounter  Facility-Administered Medications Ordered in Other Encounters   Medication Dose Route Frequency    EPINEPHrine 3000 mcg (STANDARD CONCENTRATION) IV in sodium chloride 0 9% 250 mL  1-10 mcg/min Intravenous Titrated    midazolam (VERSED) injection 1 mg  1 mg Intravenous Once    norepinephrine (LEVOPHED) 4 mg (STANDARD CONCENTRATION) IV in sodium chloride 0 9% 250 mL  0 5 mcg/kg/min Intravenous Titrated    propofol (DIPRIVAN) 1000 mg in 100 mL infusion (premix) **ADS Override Pull**         Home medications:  Cannot display prior to admission medications because the patient has not been admitted in this contact  Allergies:  Not on File  ------------------------------------------------------------------------------------------------------------  Advance Directive and Living Will:      Power of :    POLST:    ------------------------------------------------------------------------------------------------------------  Anticipated Length of Stay is > 2 midnights    Care Time Delivered:   see attending attestation      Gaviota Antunez DO        Portions of the record may have been created with voice recognition software  Occasional wrong word or "sound a like" substitutions may have occurred due to the inherent limitations of voice recognition software    Read the chart carefully and recognize, using context, where substitutions have occurred

## 2021-05-14 NOTE — H&P
H&P Exam - Critical Care   Gerald Beaulieu 79 y o  female MRN: [id number]  Unit/Bed#: [facility] 514-01 Encounter: [id number]      -------------------------------------------------------------------------------------------------------------  Chief Complaint: Cardiac arrest     History of Present Illness   HX and PE limited by: Mental status, intubation   Gerald Beaulieu is a 79 y o  female  PMH CAD with inferior wall MI s/p CABG in 2007, HTN, GERD, spinal stenosis, tobacco use who presented to [facility] in cardiac arrest  Today, 5/14 patient's landlord checked on patient after family had not heard from her, found her sitting on the toilet only able to whisper and then was unresponsive, EMS was activated  Down time approximately  6 min prior to EMS arrival when patient was found to be pulseless and CPR initiated, rhythm was asystole  ROSC was achieved in ER after approximately 52 minutes of CPR  ECG at this time was concerning for STEMI  Transferred to [facility] for interventional cardiology  Arrives to the ICU on Epinephrine 15 and Levophed at 30 which has since been weaning down       History obtained from [address] chart review and unobtainable from patient due to mental status   -------------------------------------------------------------------------------------------------------------  Assessment and Plan:    Neuro:    Diagnosis: Acute encephalopathy   o Plan: Secondary to cardiac arrest, likely some degree of anoxic injury   o CT head when stable   o Hold sedation to assess neuro exam  o TTM goal 39 F    Diagnosis: Epilepsy   o Plan: Hx seizures on Keppra   o Continue home Keppra 500 mg BID   o vEEG if any concern for seizure activity    Diagnosis: Depression   o Plan: Hold home zoloft with AMS        CV:    Diagnosis: Asystolic cardiac arrest, shock - obstructive vs cardiogenic   o Plan: Levophed 6, Epi 2 - wean for MAP goal >65   o Bedside POCUS on levophed and epinephrine with preserved LV function, decreased RV function, septal flattening   - Concern for possible PE, CTA PE protocol when stable for imaging   - D-dimer >20  - Start VTE Heparin once head CT complete to rule out ICH   o ECG without STEMI, per cardiology no cath lab at this time   o 4/12 echo - EF 35-35%, G3DD, mod pulm HTN, normal RV function, mild-mod pericardial effusion without tamponade   o TTM goal 39 F with post-arrest care - received cool IVF   o Cardiology consult    Diagnosis: Bradycardia with high degree AV block, bundle branch block, prolonged QTc  o Plan: Place TVP bedside for pacing requirements   o Give 4 gm Mg   o Keep K >4, Mg >2, Phos >3    Diagnosis: CAD with hx CABG   o Plan: ECG without STEMI at this time, no cardiac cath per cardiology  o Aspirin 81 mg daily   Diagnosis: HTN  o Plan: Hold home amlodipine, metoprolol, HCTZ with current shock on vasopressors       Pulm:   Diagnosis: Acute hypoxic respiratory failure   o Plan: Secondary to cardiac arrest   o AC/VC 22/420/70/8  o SBT daily as able   o Vent bundle ordered   o CXR for ETT placement     GI:    Diagnosis: GERD  o Plan: PPI daily   o Bowel regimen: senna/colace daily       :    Diagnosis: MUSTAPHA  o Plan: Cr baseline 0 7-0 9  o Pending repeat BMP    Diagnosis: Metabolic Acidosis   o Plan: In setting of shock/hypoperfusion   o 7 0 with HCO3 10 1 on iSTAT - give 2 amps bicarb   o Received 3 L IVF at OSH  o Check BMP, LA    F/E/N:    F: Judicious with volume resuscitation with RV dysfunction, has already received 3L IVF at OSH    E: Maintain lyes K >4, phos >3, Mg >2    N: NPO      Heme/Onc:    Diagnosis: Anemia  o Plan: Hgb 10 3, will recheck   o No obvious signs of bleeding   o Check head CT before any anticoagulation   o SCDs for DVT ppx    Diagnosis: Elevated D dimer  o Plan:  With concern for PE, will start VTE high heparin if CTH without bleed   o Check LE duplex to r/o DVT     Endo:    Diagnosis: T2DM   o Plan: Hold Metformin   o Check A1c  o ISS, BG goal 140-180    ID:     Diagnosis: Bacturia   o Plan: UA - + leuks, innumerable WBC and bacteria   o Urine culture pending   o Blood cultures pending   o MRSA swab pending   o COVID -19 negative   o Start Zosyn, avoid cefepime with hx seizure disorder     MSK/Skin:    Diagnosis: Immobility   o Plan: frequent turning/repositioning   o PT/OT when able       Disposition: Admit to Critical Care   Code Status: Level 1 - Full Code  --------------------------------------------------------------------------------------------------------------  Review of Systems   Unable to perform ROS: Intubated       Review of systems was unable to be performed secondary to encephalopathy, intubation     Physical Exam  Constitutional:       General: She is not in acute distress  Appearance: She is ill-appearing  She is not toxic-appearing  Interventions: She is intubated  HENT:      Head: Normocephalic and atraumatic  Eyes:      Comments: Pupils 3 5 mm and unreactive bilaterally    Neck:      Comments: R IJ TLC in place   Cardiovascular:      Rate and Rhythm: Bradycardia present  Rhythm irregular  Heart sounds: Normal heart sounds  Pulmonary:      Effort: No tachypnea or respiratory distress  She is intubated  Breath sounds: Normal breath sounds  Abdominal:      General: Abdomen is flat  There is no distension  Tenderness: There is no abdominal tenderness  Skin:     General: Skin is cool and dry     Neurological:      Comments: Blinks spontaneously   Weakly squeezes hands in BL UE, does not show thumbs up/2 fingers   Does not follow commands or respond to noxious stimuli in BLLE  + cough        --------------------------------------------------------------------------------------------------------------  Vitals:   Vitals:    05/14/21 1537 05/14/21 1542 05/14/21 1556 05/14/21 1617   BP: (!) 85/46 102/55 (!) 87/71    BP Location:  Left arm     Pulse: 68 64 60    Resp: (!) 26 16 14    Temp: (!) 92 8 °F (33 8 °C) Corie Large ) 92 8 °F (33 8 °C) (!) 92 8 °F (33 8 °C)    TempSrc:  Bladder     SpO2: (!) 75% (!) 78% (!) 76% 99%     Temp  Min: 92 8 °F (33 8 °C)  Max: 96 5 °F (35 8 °C)        There is no height or weight on file to calculate BMI    N/A    Laboratory and Diagnostics:  Results from last 7 days   Lab Units 05/14/21  1527 05/14/21  1505 05/14/21  1309   WBC Thousand/uL 16 13*  --  17 57*   HEMOGLOBIN g/dL 11 3*  --  10 3*   I STAT HEMOGLOBIN g/dl  --  11 2*  --    HEMATOCRIT % 35 8  --  31 9*   HEMATOCRIT, ISTAT %  --  33*  --    PLATELETS Thousands/uL 204  --  187   BANDS PCT %  --   --  2   MONO PCT %  --   --  1*     Results from last 7 days   Lab Units 05/14/21  1527 05/14/21  1505 05/14/21  1309   SODIUM mmol/L 147*  --  150*   POTASSIUM mmol/L 3 9  --  3 6   CHLORIDE mmol/L 118*  --  116*   CO2 mmol/L 13*  --  17*   CO2, I-STAT mmol/L  --  18*  --    ANION GAP mmol/L 16*  --  17*   BUN mg/dL 32*  --  27*   CREATININE mg/dL 1 68*  --  1 72*   CALCIUM mg/dL 8 1*  --  7 2*   GLUCOSE RANDOM mg/dL 205*  --  229*   ALT U/L  --   --  125*   AST U/L  --   --  253*   ALK PHOS U/L  --   --  98   ALBUMIN g/dL  --   --  1 8*   TOTAL BILIRUBIN mg/dL  --   --  0 33     Results from last 7 days   Lab Units 05/14/21  1527 05/14/21  1309   MAGNESIUM mg/dL 2 1 1 7   PHOSPHORUS mg/dL 4 6*  --       Results from last 7 days   Lab Units 05/14/21  1527 05/14/21  1309   INR  1 36* 1 49*   PTT seconds 32 35      Results from last 7 days   Lab Units 05/14/21  1527 05/14/21  1309   TROPONIN I ng/mL 2 48* 0 64*     Results from last 7 days   Lab Units 05/14/21  1527 05/14/21  1309   LACTIC ACID mmol/L 7 8* 6 5*     ABG:  Results from last 7 days   Lab Units 05/14/21  1210   PH ART  7 000*   PCO2 ART mm Hg 30 5*   PO2 ART mm Hg 261 6*   HCO3 ART mmol/L 7 4*   BASE EXC ART mmol/L -22 8   ABG SOURCE  Brachial, Left     VBG:  Results from last 7 days   Lab Units 05/14/21  1210   ABG SOURCE  Brachial, Left           Micro:        EKG: high degree AV block, RBBB rate 68   Imaging:CXR, CTH pending     Historical Information   Past Medical History:   Diagnosis Date    CAD (coronary artery disease), native coronary artery 2007    stent and CABG x 3     Cervical spinal stenosis     Gastric ulcer     GERD (gastroesophageal reflux disease)     HTN (hypertension)      Past Surgical History:   Procedure Laterality Date    CORONARY ARTERY BYPASS GRAFT  2007    x3, using artery graft performed by Dr Radha Gómez at [address] [facility] History     Substance and Sexual Activity   Alcohol Use None    Comment: unable to obtain due to health status     Social History     Substance and Sexual Activity   Drug Use Not on file    Comment: unable to obtain due to health status     Social History     Tobacco Use   Smoking Status Current Every Day Smoker   Smokeless Tobacco Never Used   Tobacco Comment    unable to obtain full history due to acuity status     Exercise History: Unknown   Family History:   Family History   Problem Relation Age of Onset    Diabetes Mother     Cardiomyopathy Mother 36    Intracerebral hemorrhage Father 36    Hypertension Brother     Cardiomyopathy Brother 43        received cardiac transplant at age 43    Hypertension Brother     Coronary artery disease Brother 54     Family history unknown      Medications:  Current Facility-Administered Medications   Medication Dose Route Frequency    aspirin chewable tablet 81 mg  81 mg Oral Daily    chlorhexidine (PERIDEX) 0 12 % oral rinse 15 mL  15 mL Mouth/Throat Q12H [address]    EPINEPHrine 3000 mcg (STANDARD CONCENTRATION) IV in sodium chloride 0 9% 250 mL  1-10 mcg/min Intravenous Titrated    insulin lispro (HumaLOG) 100 units/mL subcutaneous injection 1-5 Units  1-5 Units Subcutaneous Q6H [address]    levETIRAcetam (KEPPRA) 500 mg in sodium chloride 0 9 % 100 mL IVPB  500 mg Intravenous Q12H [address]    magnesium sulfate 4 g/100 mL IVPB (premix) 4 g  4 g Intravenous Once    norepinephrine (LEVOPHED) 4 mg (STANDARD CONCENTRATION) IV in sodium chloride 0 9% 250 mL  1-30 mcg/min Intravenous Titrated    omeprazole (PRILOSEC) suspension 2 mg/mL  20 mg Oral Early Morning    potassium chloride 40 mEq IVPB (premix)  40 mEq Intravenous Once    senna-docusate sodium (SENOKOT S) 8 6-50 mg per tablet 1 tablet  1 tablet Oral BID     Home medications:  None     Allergies:  Not on File  ------------------------------------------------------------------------------------------------------------  Advance Directive and Living Will:      Power of :    POLST:    ------------------------------------------------------------------------------------------------------------  Anticipated Length of Stay is > 2 midnights    Care Time Delivered:   No Critical Care time spent       Johnnie Cagle PA-C        Portions of the record may have been created with voice recognition software  Occasional wrong word or "sound a like" substitutions may have occurred due to the inherent limitations of voice recognition software    Read the chart carefully and recognize, using context, where substitutions have occurred

## 2021-05-14 NOTE — PROCEDURES
Temporary Transvenous Pacemaker Placement   Lorraine Jack 79 y o  female MRN: [id number]  Unit/Bed#: [facility] 090-84 Encounter: [id number]  Date of procedure: 05/14/2021    Preop diagnosis:  severe symptomatic bradycardia with shock    Postop diagnosis:  severe symptomatic bradycardia with shock    Provider performing procedure:  Jonel Wiseman    Procedure performed at bedside    Sedation:  Fentanyl    Procedure performed:  Temporary transvenous pacemaker placement    Procedure details:  Introducer catheter was placed prior to insertion of temporary transvenous pacemaker in the right IJ  Please refer to separate procedure note for documentation of placement of introducer catheter  Access port was prepped and draped in a sterile fashion  Single V lead transvenous pacer was assessed for balloon stability and connected to pacer box prior to insertion  Pacer was inserted through access port to approximately 15 cm and balloon was inflated  Pacer was advanced until pacer spikes in capture were visualized on monitor  Balloon was deflated to assess for continued capture  Pacer rate set at 90 beats per minute at 5mA with insertion  mA decreased to lowest level to achieve capture  Postprocedure x-ray with ventricular wire appropriately positioned in right ventricle  Patient remained hemodynamically appropriate with pacing  Pacer wire secured at 31 cm at the Homme  Chest x-ray obtained postprocedure with pacemaker in good position  Ultrasound also confirmed pacemaker in RV      EBL:  None    Complications:  None    Procedure does not include critical care time or family update

## 2021-05-14 NOTE — PROCEDURES
Central Line Insertion    Date/Time: 5/14/2021 12:30 PM  Performed by: Nunu Pillai MD  Authorized by: Nunu Pillai MD     Patient location:  ED  Other Assisting Provider: No    Consent:     Consent obtained:  Emergent situation  Universal protocol:     Patient identity confirmed:  Hospital-assigned identification number  Pre-procedure details:     Hand hygiene: Hand hygiene performed prior to insertion      Sterile barrier technique: All elements of maximal sterile technique followed      Skin preparation:  2% chlorhexidine  Indications:     Central line indications: medications requiring central line and hemodynamic monitoring    Anesthesia (see MAR for exact dosages): Anesthesia method:  None  Procedure details:     Location:  Right internal jugular    Vessel type: vein      Laterality:  Right    Approach: percutaneous technique used      Patient position:  Flat    Catheter type:  Triple lumen    Catheter size:  7 Fr    Landmarks identified: yes      Ultrasound guidance: yes      Ultrasound image availability:  Not saved    Manometry confirmation: yes      Number of attempts:  1    Successful placement: yes    Post-procedure details:     Post-procedure:  Dressing applied and line sutured    Assessment:  Blood return through all ports, no pneumothorax on x-ray, placement verified by x-ray and free fluid flow    Post-procedure complications: none      Patient tolerance of procedure:   Tolerated well, no immediate complications

## 2021-05-14 NOTE — RESPIRATORY THERAPY NOTE
RT Ventilator Management Note  Amilcar Pearson 79 y o  female MRN: [id number]  Unit/Bed#:  13B Encounter: [id number]      Daily Screen       5/14/2021  1155             Patient safety screen outcome[de-identified]  Failed    Not Ready for Weaning due to[de-identified]  Underline problem not resolved            Physical Exam:   Assessment Type: (P) Assess only  General Appearance: (P) Unresponsive  Respiratory Pattern: (P) Assisted  Chest Assessment: (P) Chest expansion symmetrical  Bilateral Breath Sounds: (P) Diminished  Cough: (P) None  O2 Device: vent      Resp Comments: (P) Pt arrived via EMS, intubated with 7  0ETT/24 @ lip, CPR in progress  Pt bagged until ROSC regained  Pt placed on vent APVcmv 18/420/100/6  Pt has no gag or cough with suction

## 2021-05-14 NOTE — PROGRESS NOTES
Attending admission note  - [facility] 79 y o  female MRN: [id number]  Unit/Bed#: [facility] 514-93 Encounter: [id number]       Patient is a 68-year-old female who was scheduled to have a meeting with her landlord this morning at 8:00 a m     When patient did not show a for meaning landlord went to check on patient and found her unresponsive in the bathroom  Initially patient was reported to be speaking  Her friend was contacted at the patient's request who came to patient's apartment immediately and found her to have thready pulses  911 was then contacted  Upon arrival of EMS patient was reported to not have palpable pulses and CPR was initiated  Initial rhythm per EMS report was asystole  Patient was transported to [facility]  She required ongoing CPR and resuscitation with initiation of vasopressors and multiple boluses of ACLS medications  EKG obtained in the emergency department was concerning for STEMI  Patient was transported [facility] for ongoing critical care and resuscitation along with possible cardiac catheterization  After obtaining return of spontaneous circulation patient was reported to have spontaneous purposeful movement but not following commands  She was reported to have opened her eyes once  After reviewing patient's resuscitation course it appears that she had approximately 52 minutes of cardiac resuscitation before stabilization to return of spontaneous circulation  With return of spontaneous circulation patient arrived to [facility] on norepinephrine 30 mcg infusion and epinephrine 15 mcg infusion  Patient did receive 2 L of cold saline with a overall volume of 3 L administered  Core temperature by esophageal probe on arrival 32 9°      Vitals:    05/14/21 1700   BP: 93/69   Pulse: 78   Resp: 16   Temp: (!) 92 8 °F (33 8 °C)   SpO2: 100%     GEN:  Unresponsive  HEENT:  Pupils 4 mm not reactive to light  CV:  Irregular rhythm, no murmur  Resp:  Coarse breath sounds bilateral  GI: soft,NT/ND  : urinary catheter in place with cloudy urine  Neuro:  Patient does not respond to verbal and tactile stimuli, cough reflex intact, no withdrawal x4 extremities  Skin:  Cool, dry    Cardiac arrest-unclear etiology differential includes primary cardiac event versus pulmonary embolism versus metabolic etiology resulting in cardiac arrest  Shock-undifferentiated cardiogenic versus obstructive versus septic  Cardiac arrhythmia with variable block  Acute hypoxic respiratory failure  Acute encephalopathy-multifactorial metabolic cannot exclude CNS etiology  Metabolic acidosis/lactic acidosis  Acute kidney injury  Hypokalemia  Hypomagnesemia  Urinary tract infection  Hyperglycemia with diabetes mellitus type 2-glycemic control with insulin protocol  Seizure disorder  Coronary artery disease with history of CABG 14 years ago  Hypertension history  GERD history    Repeat EKG did not show ST elevation  Patient had a variable block identified with multiple PVCs and ectopic beats  Cardiology present at bedside shortly after patient arrived with bedside point of care ultrasound showing significantly enlarged left ventricular walls concerning for possible amyloid with small LV cavity as well as a large right ventricle and right atrium with poor wall motion  During this time i-STAT obtained which showed pH to be 7 03  Bicarbonate administered  Vasopressors were able to be titrated  In discussion with Cardiology due to patient's significant metabolic derangements and appearance of significantly depressed RV function plan to continue with medical management  Patient is not having ST elevations on EKG, cardiac catheterization deferred  Patient was able to be weaned off of summer for vasopressor support  Epinephrine was transitioned off with significant LV wall hypertrophy there is concern that epinephrine may worsen hemodynamic status    Continue with norepinephrine  Consider addition of vasopressin if patient requires additional hemodynamic support  Patient is having a variable conduction block with significant arrhythmia and poor  perfusing beats  Introducer catheter was placed for possibility of need for pacemaker placement  Patient continued to have block and occasional pauses with hemodynamic lability  V wire placed through introducer catheterization in right subclavian vein  Patient got good capture  She appears to have better hemodynamic status with heart rate approximately 90  Patient heart rate was ranging from 50 to 80 prior to placement of pacemaker  She had electrical activity but was not having mechanical activity with every beat in looking at arterial waveform tracing and electrical EKG tracing  Patient has variable block may be secondary to metabolic etiology  Bicarbonate infusion initiated  Continue with supportive measures  Continue with ventilatory support  Patient is not appropriate for spontaneous breathing trial with her significant metabolic derangements  Aggressive airway clearance protocol  Titrate FiO2 to maintain saturation greater than 92%  Patient is difficult to assess with pulse ox secondary to cool distal extremities  She may require multiple ABGs to titrate FiO2  Continue with current PEEP at 8  CT scan of the head to be obtained to evaluate for possible CNS etiology for patient's encephalopathic state  CT chest abdomen pelvis for undifferentiated shock  No contrast to be administered secondary to acute kidney injury  Continue to trend patient's BUN and creatinine as well as monitor urine output  Urinalysis appears to have significant amount of leukocytes  Antibiotics initiated for possibility of sepsis related to UTI  Patient's daughter Keith Doyle was updated regards to current clinical status  Code status was discussed and patient was updated to DNR 2      Update:  Upon repositioning for placement of transvenous pacer wire patient opened her eyes and appear to be awake and alert  She was asked if she can hear S speaking to her for which she nodded her head yes  She was asked if she was uncomfortable and in pain for which she shrugged her shoulders  Patient was then asked to move her fingers and her toes for which he responded appropriately  She squeeze with both hands and released when requested  Patient's ago for CT scans  Continue with hemodynamic support and respiratory support  Critical care night team to update family after CT scan obtained      Critical care time 92 minutes, critical care time does not include procedures family update

## 2021-05-14 NOTE — CONSULTS
Consultation - Advanced Heart Failure Team  Jacob Norwood 79 y o  female MRN: [id number]  Unit/Bed#: [facility] 549-81 Encounter: [id number]      Consults  PCP: Johana Hassan DO    History of Present Illness   Physician Requesting Consult: Silvino Garcia MD  Reason for Consult / Principal Problem: Cardiac Arrest    HPI: Jacob Norwood is a 79y o  year old female with a history of Prior Inferior Wall STEMI s/p CABG (2007, unknown anatomy), CKD3B, HTN, HFrEF (30-35% on Echo 4/16/2021 --[address]), tobacco use who is transferred from [address] [place] [address] s/p cardiac arrest      History is predominantly obtained through chart review as patient is intubated and sedated on my examination  As per documentation, patient had a witnessed cardiac arrest while sitting on the toilet  No CPR was conducted by family members prior to EMS arrival, documented down time is Approximately 6 Minutes  Upon EMS arrival, ACLS was initiated and was ongoing for approximately 1 hour before ROSC was achieved  Patient was also started on Levophed + Epinephrine for hypotension  Patient transferred to [facility] him for further workup and management  Review of Systems  ROS as noted above  Historical Information   No past medical history on file  No past surgical history on file  Social History     Substance and Sexual Activity   Alcohol Use Not on file     Social History     Substance and Sexual Activity   Drug Use Not on file     Social History     Tobacco Use   Smoking Status Not on file     Family History: No family history on file  Meds/Allergies   Hospital Medications:   No current facility-administered medications for this encounter        Facility-Administered Medications Ordered in Other Encounters   Medication Dose Route Frequency    EPINEPHrine 3000 mcg (STANDARD CONCENTRATION) IV in sodium chloride 0 9% 250 mL  1-10 mcg/min Intravenous Titrated    midazolam (VERSED) injection 1 mg  1 mg Intravenous Once    norepinephrine (LEVOPHED) 4 mg (STANDARD CONCENTRATION) IV in sodium chloride 0 9% 250 mL  0 5 mcg/kg/min Intravenous Titrated     Home Medications:   No medications prior to admission  Not on File    Objective   Vitals: There were no vitals taken for this visit  Invasive Devices     Central Venous Catheter Line            CVC Central Lines 05/14/21 Triple Right Subclavian less than 1 day          Peripheral Intravenous Line            Peripheral IV 05/14/21 Right Antecubital less than 1 day    Peripheral IV 05/14/21 Right Hand less than 1 day          Arterial Line            Arterial Line 05/14/21 Left Radial less than 1 day          Drain            NG/OG/Enteral Tube Orogastric 18 Fr Center mouth less than 1 day    Urethral Catheter Temperature probe 16 Fr  less than 1 day          Airway            ETT  Cuffed 7 mm less than 1 day    ETT  Oral 7 mm less than 1 day                Physical Exam  GEN: Christie Dobbs Intubated and sedated  NECK: No JVD or carotid bruits   HEART: reg rhythm, tachy rate, normal S1 and S2, no murmurs, clicks, gallops or rubs   LUNGS: coarse breath sounds in bilateral lung fields  ABDOMEN:  Normoactive bowel sounds, soft, no tenderness, no distention  EXTREMITIES: peripheral pulses palpable; no edema  NEURO: sedated  Blown pupils    SKIN:  Dry, intact, cool to touch (on pressors)    Lab Results:   CBC with diff:   Results from last 7 days   Lab Units 05/14/21  1309   WBC Thousand/uL 17 57*   RBC Million/uL 3 20*   HEMOGLOBIN g/dL 10 3*   HEMATOCRIT % 31 9*   MCV fL 100*   MCH pg 32 2   MCHC g/dL 32 3   RDW % 14 6   MPV fL 10 4   PLATELETS Thousands/uL 187     CMP:   Results from last 7 days   Lab Units 05/14/21  1309   SODIUM mmol/L 150*   POTASSIUM mmol/L 3 6   CHLORIDE mmol/L 116*   CO2 mmol/L 17*   BUN mg/dL 27*   CREATININE mg/dL 1 72*   CALCIUM mg/dL 7 2*   AST U/L 253*   ALT U/L 125*   ALK PHOS U/L 98   EGFR ml/min/1 73sq m 30     Troponin:   0   Lab Value Date/Time    TROPONINI 0 64 (H) 05/14/2021 1309     BNP:   Results from last 7 days   Lab Units 05/14/21  1309   POTASSIUM mmol/L 3 6   CHLORIDE mmol/L 116*   CO2 mmol/L 17*   BUN mg/dL 27*   CREATININE mg/dL 1 72*   CALCIUM mg/dL 7 2*   EGFR ml/min/1 73sq m 30     Coags:   Results from last 7 days   Lab Units 05/14/21  1309   PTT seconds 35   INR  1 49*     TSH:     Magnesium:   Results from last 7 days   Lab Units 05/14/21  1309   MAGNESIUM mg/dL 1 7     Lipid Profile:     Results from last 7 days   Lab Units 05/14/21  1309   TROPONIN I ng/mL 0 64*   NT-PRO BNP pg/mL 7,841*     Results from last 7 days   Lab Units 05/14/21  1309   POTASSIUM mmol/L 3 6   CO2 mmol/L 17*   CHLORIDE mmol/L 116*   BUN mg/dL 27*   CREATININE mg/dL 1 72*   ALT U/L 125*   AST U/L 253*     Results from last 7 days   Lab Units 05/14/21  1309   WBC Thousand/uL 17 57*   HEMOGLOBIN g/dL 10 3*   HEMATOCRIT % 31 9*   MCV fL 100*   PLATELETS Thousands/uL 187     Results from last 7 days   Lab Units 05/14/21  1309   INR  1 49*             Imaging: I have personally reviewed pertinent reports  ECHO: 4/16/2021 for    Right Ventricle: Right ventricle cavity is normal     Right Ventricle: Systolic function is normal     Left Atrium: Left atrium cavity size is normal     Left Ventricle: Left ventricle is normal in size    Left Ventricle: There is mild concentric hypertrophy    Left Ventricle: Systolic function is severely decreased with an   ejection fraction of 30-35%    Left Ventricle: There is grade III (severe) diastolic dysfunction and elevated left atrial pressure    Left Ventricle: Global hypokinesis    Mitral Valve: There is mild regurgitation    Tricuspid Valve: There is mild regurgitation    Pulmonic Valve: There is mild to moderate pulmonary hypertension    Pericardium: There is a small to moderate circumferential pericardial effusion predominantly posterior to the heart   Maximum dimension of 1 0 cm    Pericardium: There is no echocardiographic evidence of tamponade  Given LVH, pericardial effusion severe diastolic dysfunction, pulmonary hypertension, consider infiltrative cardiomyopathy such as cardiac amyloidosis  CATH/STRESS TEST:   n/a    EKG:   Date: 5/14/2021  Interpretation: Unclear rhythm (p-wave not discernable)  JAZZMINE Inferior and Anterolateral Leads  Right axis deviation  VTE Prophylaxis: Heparin       Assessment/Plan     Assessment:    1  Cardiac Arrest, Possibly 2/2 Massive PE vs  Possible Arrhythmia vs  STEMI   --> [facility] suggests patients has LVH, had a pericardial effusion with G3DD as well as mild to moderate pHTN  Possibly suggestive of Cardiac Amyloidosis  --> Bedside Echo suggests Severely thickening LV walls and small LV cavity  Dilated RV with McConnel signs and a dilated IVC alluding to a massive PE  2  HFrEF (30-35%)  3  Hypertension  4  Hx  Of Previous Inferior STEMI s/p CABG (2007), Unclear anatomy  5  CKD3B  6  Acute Metabolic Encephalopathy 2/2 Cardiac Arrest likely complicated by anoxic injury  --> Unclear mental status given degree of down time  7  HAGMA Likely Multifactorial in setting of MUSTAPHA + Lactic Acidosis in setting of Cardiac Arrest  8  Hypernatremia  9  Acute Liver Injury 2/2 Hypoperfusion    Plan:  - Trend troponin until down trending  - f/u TTE  - titrate pressors as tolerated  - admitted to critical care  - replete electrolytes PRN (Mg 2 / Phos 3 / K 4)  - monitor on telemetry  - supportive care, prognosis guarded    Case discussed and reviewed with Dr Ramon Hayneso who agrees with my assessment and plan  Thank you for involving us in the care of your patient  Mallorie Glover MD  Cardiology Fellow PGY-4      ==========================================================================================    Counseling / Coordination of Care  Total floor / unit time spent today 45 minutes minutes    Greater than 50% of total time was spent with the patient and / or family counseling and / or coordination of care  A description of the counseling / coordination of care:         Epic/ Allscripts/Care Everywhere records reviewed:     ** Please Note: Fluency DirectDictation voice to text software may have been used in the creation of this document   **

## 2021-05-14 NOTE — PROCEDURES
Arterial Line Insertion    Date/Time: 5/14/2021 12:40 PM  Performed by: Vi Dominguez MD  Authorized by: Vi Dominguez MD     Patient location:  ED  Consent:     Consent obtained:  Emergent situation  Universal protocol:     Patient identity confirmed:  Hospital-assigned identification number  Indications:     Indications: hemodynamic monitoring, multiple ABGs, continuous blood pressure monitoring and frequent labs / infusion    Pre-procedure details:     Skin preparation:  Chlorhexidine  Anesthesia (see MAR for exact dosages): Anesthesia method:  None  Procedure details:     Location / Tip of Catheter:  Radial    Laterality:  Left    Needle gauge:  20 G    Placement technique:  Percutaneous, ultrasound guided and Seldinger    Ultrasound image availability:  Not saved    Number of attempts:  1    Successful placement: yes      Transducer: waveform confirmed    Post-procedure details:     Post-procedure:  Secured with tape and sterile dressing applied    Patient tolerance of procedure:   Tolerated well, no immediate complications

## 2021-05-15 NOTE — PROCEDURES
POC Cardiac US    Date/Time: 5/15/2021 2:25 PM  Performed by: Marvella Apley, DO  Authorized by: Marvella Apley, DO     Patient location:  ICU  Procedure details:     Exam Type:  Diagnostic    Indications: hypotension      Assessment / Evaluation for: cardiac function and inferior vena cava for fluid responsiveness      Exam Type: follow-up exam      Image quality: limited diagnostic      Image availability:  Images available in PACS  Patient Details:     Cardiac Rhythm:  Irregular    Medications: norepinephrine infusion and vasopressin      Mechanical ventilation: No    Cardiac findings:     Echo technique: limited 2D      Views obtained: parasternal long axis, parasternal short axis, subcostal and apical      Pericardial effusion comment:  Small    Tamponade physiology: absent      LV systolic function: depressed      RV dilation: severe    IVC findings:     IVC Size: indeterminate    Interpretation:      RV function slightly improved, RV still dilated    CC time does not include procedure

## 2021-05-15 NOTE — CASE MANAGEMENT
67yo female transferred from  [facility] after cardiac arrest  She had +STEMI, cardiac arrest, to [facility] then flown [facility]  She was vented but just extubated this am  She is alert and oriented but still some confusion and does not remember yesterday at all  She lives alone in a second floor apt  With elevator  She has a RW and CPAP machine  She has hx of HHC but does not remember whom  States she does drive  Does have epilepsy history  Pt  Unsure if she ever had rehab  She has a daughter who is spokesperson: [employer] at [phone]  Her PCP is Dr Deepa Wilkerson and she uses [facility]  CM following and will attempt to obtain any other info from daughter when she arrives  CM reviewed d/c planning process including the following: identifying help at home, patient preference for d/c planning needs, Discharge Lounge, [facility] Meds to Bed program, availability of treatment team to discuss questions or concerns patient and/or family may have regarding understanding medications and recognizing signs and symptoms once discharged  CM also encouraged patient to follow up with all recommended appointments after discharge  Patient advised of importance for patient and family to participate in managing patients medical well being  CM reviewed d/c planning process including the following: identifying help at home, patient preference for d/c planning needs, Discharge Lounge, [facility] Meds to Bed program, availability of treatment team to discuss questions or concerns patient and/or family may have regarding understanding medications and recognizing signs and symptoms once discharged  CM also encouraged patient to follow up with all recommended appointments after discharge  Patient advised of importance for patient and family to participate in managing patients medical well being

## 2021-05-15 NOTE — PROGRESS NOTES
Cardiology Progress Note - Dede Levy 79 y o  female MRN: [id number]    Unit/Bed#: [facility] 827-94 Encounter: [id number]    Assessment and plan  1  PEA cardiac arrest  2  Severe RV dysfunction suspect secondary to pulmonary embolism  3  Bilateral lower extremity DVT  4  Preserved LV function with severe LVH  5  Coronary disease history of CABG stable without angina  6  Non MI elevated troponin  7  Transient complete heart block due to metabolic derangements transvenous pacer in place but turned off  8  Acute kidney injury  9  Lactic acidosis    Recommendations: The patient's mental status has dramatically improved she is currently extubated and fully functional   She has no recollection of the events of yesterday  Currently comfortable denying any anginal sounding chest pain  She is tender to palpation likely from the prolonged CPR  Echocardiogram highly suggestive of acute pulmonary embolism  Continue full anticoagulation with heparin  Check limited echocardiogram tomorrow to evaluate any recovery of RV function  Hemodynamics have been stable  Transient AV block has resolved continue transvenous pacer in place will discontinue tomorrow if no further AV block  Case discussed with critical care  No signs of acute coronary syndrome  Subjective:    No significant events overnight  Mental status significantly improved she was awake following commands and extubated this morning  Moving all extremities neurologically intact  Denies any anginal sounding chest pain, shortness of breath just feels slightly confused as to how she ended up here  ROS    Objective:   Vitals: Blood pressure 143/87, pulse 92, temperature 99 °F (37 2 °C), resp  rate 16, weight 60 6 kg (133 lb 9 6 oz), SpO2 99 %  , There is no height or weight on file to calculate BMI ,   Orthostatic Blood Pressures      Most Recent Value   Blood Pressure  143/87 filed at 05/15/2021 7018   Patient Position - Orthostatic VS  Lying filed at 05/14/2021 1238         Systolic (17HZY), VHF:025 , Min:67 , STU:026     Diastolic (78TVX), XCM:70, Min:43, Max:105      Intake/Output Summary (Last 24 hours) at 5/15/2021 0915  Last data filed at 5/15/2021 0907  Gross per 24 hour   Intake 3419 76 ml   Output 385 ml   Net 3034 76 ml     Weight (last 2 days)     Date/Time   Weight    05/15/21 0542   60 6 (133 6)    05/15/21 0541   60 6 (133 6)    05/14/21 1625   57 6 (126 99)                Telemetry Review: No significant arrhythmias seen on telemetry review  EKG personally reviewed by Amor Gallardo DO  Physical Exam  Vitals signs and nursing note reviewed  Constitutional:       General: She is not in acute distress  Appearance: She is well-developed  HENT:      Head: Normocephalic and atraumatic  Eyes:      Conjunctiva/sclera: Conjunctivae normal       Pupils: Pupils are equal, round, and reactive to light  Neck:      Musculoskeletal: Neck supple  Cardiovascular:      Rate and Rhythm: Normal rate and regular rhythm  Heart sounds: Normal heart sounds  No murmur  No friction rub  Pulmonary:      Effort: Pulmonary effort is normal  No respiratory distress  Breath sounds: Normal breath sounds  No wheezing or rales  Abdominal:      General: Bowel sounds are normal  There is no distension  Palpations: Abdomen is soft  Tenderness: There is no abdominal tenderness  There is no rebound  Musculoskeletal: Normal range of motion  General: No tenderness or deformity  Skin:     General: Skin is warm and dry  Findings: No erythema  Neurological:      Mental Status: She is alert and oriented to person, place, and time  Cranial Nerves: No cranial nerve deficit             Laboratory Results:  Results from last 7 days   Lab Units 05/14/21 2111 05/14/21  1844 05/14/21  1527   TROPONIN I ng/mL 15 50* 8 06* 2 48*       CBC with diff:   Results from last 7 days   Lab Units 05/15/21  0436 05/14/21 2123 05/14/21  1844 05/14/21  1527 05/14/21  1505 05/14/21  1309   WBC Thousand/uL 16 90* 18 96* 23 87* 16 13*  --  17 57*   HEMOGLOBIN g/dL 10 1* 10 4* 11 3* 11 3*  --  10 3*   I STAT HEMOGLOBIN g/dl  --   --   --   --  11 2*  --    HEMATOCRIT % 30 6* 32 7* 36 1 35 8  --  31 9*   HEMATOCRIT, ISTAT %  --   --   --   --  33*  --    MCV fL 97 99* 101* 101*  --  100*   PLATELETS Thousands/uL 187 187 226 204  --  187   MCH pg 32 1 31 6 31 7 31 8  --  32 2   MCHC g/dL 33 0 31 8 31 3* 31 6  --  32 3   RDW % 14 8 15 0 15 0 14 8  --  14 6   MPV fL 11 1 10 8 10 6 10 5  --  10 4   NRBC AUTO /100 WBCs 0 0 0 0  --   --          CMP:  Results from last 7 days   Lab Units 05/15/21  0436 05/15/21  0046 05/14/21 1844 05/14/21  1527 05/14/21  1505 05/14/21  1309   POTASSIUM mmol/L 4 0 4 3 5 0 3 9  --  3 6   CHLORIDE mmol/L 114* 116* 118* 118*  --  116*   CO2 mmol/L 19* 16* 11* 13*  --  17*   CO2, I-STAT mmol/L  --   --   --   --  18*  --    BUN mg/dL 32* 33* 35* 32*  --  27*   CREATININE mg/dL 1 81* 1 77* 1 85* 1 68*  --  1 72*   GLUCOSE, ISTAT mg/dl  --   --   --   --  208*  --    CALCIUM mg/dL 8 8 8 1* 8 8 8 1*  --  7 2*   AST U/L  --   --   --   --   --  253*   ALT U/L  --   --   --   --   --  125*   ALK PHOS U/L  --   --   --   --   --  98   EGFR ml/min/1 73sq m 28 29 27 31  --  30         BMP:  Results from last 7 days   Lab Units 05/15/21  0436 05/15/21  0046 05/14/21  1844 05/14/21  1527 05/14/21  1505 05/14/21  1309   POTASSIUM mmol/L 4 0 4 3 5 0 3 9  --  3 6   CHLORIDE mmol/L 114* 116* 118* 118*  --  116*   CO2 mmol/L 19* 16* 11* 13*  --  17*   CO2, I-STAT mmol/L  --   --   --   --  18*  --    BUN mg/dL 32* 33* 35* 32*  --  27*   CREATININE mg/dL 1 81* 1 77* 1 85* 1 68*  --  1 72*   GLUCOSE, ISTAT mg/dl  --   --   --   --  208*  --    CALCIUM mg/dL 8 8 8 1* 8 8 8 1*  --  7 2*       BNP: No results for input(s): BNP in the last 72 hours      Magnesium:   Results from last 7 days   Lab Units 05/15/21  0436 05/15/21  0046 05/14/21  1844 05/14/21  1527 05/14/21  1309   MAGNESIUM mg/dL 2 8* 3 0* 3 7* 2 1 1 7       Coags:   Results from last 7 days   Lab Units 05/15/21  0339 05/14/21 2123 05/14/21  1844 05/14/21  1527 05/14/21  1309   PTT seconds >210* 27 30 32 35   INR   --  1 35* 1 35* 1 36* 1 49*       TSH:        Hemoglobin A1C   Results from last 7 days   Lab Units 05/14/21  1844   HEMOGLOBIN A1C % 6 3*       Lipid Profile:       Cardiac testing:   No results found for this or any previous visit  No results found for this or any previous visit  No results found for this or any previous visit  No results found for this or any previous visit  Meds/Allergies   all current active meds have been reviewed  No medications prior to admission  heparin (porcine), 3-30 Units/kg/hr (Order-Specific), Last Rate: 15 Units/kg/hr (05/15/21 0600)  multi-electrolyte, 50 mL/hr, Last Rate: 100 mL/hr (05/15/21 0454)      Assessment:  Principal Problem:    Cardiac arrest ([facility] [address] )  Active Problems:    Positive D dimer    Elevated troponin    Encephalopathy acute    MUSTAPHA (acute kidney injury) ([facility] [address] )    Metabolic acidosis    Lactic acidosis    Epilepsy ([facility] [address] )    Depression    Shock ([facility] [address] )    CAD (coronary artery disease)    Hypertension    Arrhythmia    [facility]    Anemia    UTI (urinary tract infection)    Type 2 diabetes mellitus ([facility] [address] )            Counseling / Coordination of Care  Total floor / unit time spent today 25 minutes  Greater than 50% of total time was spent with the patient and / or family counseling and / or coordination of care  A description of the counseling / coordination of care: Sandra Al

## 2021-05-15 NOTE — PROCEDURES
POC Cardiac US    Date/Time: 5/15/2021 10:58 AM  Performed by: Conrado Moreno PA-C  Authorized by: Conrado Moreno PA-C     Patient location:  ICU  Procedure details:     Indications: hypotension      Assessment / Evaluation for: cardiac function, pericardial effusion and right heart strain (suspected pulmonary embolism)      Exam Type: follow-up exam      Image quality: non-diagnostic      Image availability:  Images available in PACS and video obtained  Patient Details:     Cardiac Rhythm:  Irregular    Medications: norepinephrine infusion      Mechanical ventilation: No    Cardiac findings:     Echo technique: limited 2D      Views obtained: parasternal long axis and parasternal short axis      Pericardial effusion comment:  Mild, stable    Tamponade physiology: absent      LV systolic function comment:  Appears improved from prior    RV dilation: severe      RV dilation comment:  Overall RV systolic function appears improved

## 2021-05-15 NOTE — PROGRESS NOTES
Patient having episode of hypotension status post extubation  She was complaining of feeling weak and seemed to be little confused  Norepinephrine infusion restarted    Repeat labs pending

## 2021-05-15 NOTE — PROGRESS NOTES
Daily Progress Note - Critical Care   Cathleen Ly 79 y o  female MRN: [id number]  Unit/Bed#: [facility] 384-04 Encounter: [id number]        ----------------------------------------------------------------------------------------  HPI/24hr events: 78 yo F transfer from [place] for out of hospital witnessed arrest with reported 6 min down time and 52 minutes CPR before ROSC achieved  She was found to have findings consistent with PE on US and was started on VTE heparin gtt  BLE Duplex negative for DVT  On arrival to ICU patient was hypothermic but following commands  TTM was aborted and patient was warmed and kept at 37 degrees, transitioned off arctic sun  She had a temporary pacing wire placed for bradyarrythmias  Pacer turned to back up rate early in the evening and remains in NSR at this time  Her vasopressors have all been weaned off and her acidosis has corrected on her ABG  Her lactic acidosis continues to trend down but has not yet cleared  On-going cautious volume resuscitation with EF 30-35%      ---------------------------------------------------------------------------------------  SUBJECTIVE      Review of Systems  Review of systems was unable to be performed secondary to intubated/sedated   ---------------------------------------------------------------------------------------  Assessment and Plan:    Neuro:    Diagnosis: Acute Encephalopathy   o Plan: multifactorial in setting of critical illness, polypharmacy, possible anoxic injury  o Delirium precautions  Consider melatonin for sleep wake regulation    Diagnosis: Epilepsy   o Plan: home keppra 500 mg BID   o Consider vEEG if remains altered from baseline   o Cont home zoloft  o PRN analgesia   o Propofol and fentanyl for sedation while intubated       CV:    Diagnosis: asystolic cardiac arrest, shock - resolved, elevated troponin in setting of prolonged CPR   o Plan: cardiology evaluated, following   No planned intervention   o Trend troponin o No ST elevations  o Formal ECHO today  ECHO 4/12 EF 30-35%, G3DD  o CAD with Hx CABG - cont asa   o Hold antihypertensives on hold - PRN labetalol for SBP > 160  Consider restarting PO meds when able    Diagnosis: Bradycardia   o Plan: likely related to hypothermia   o Temp pacing wire remain at back-up 50 bpm voltage 5 amps   o Prolonged QtC - follow-up AM ECG  Avoid medications that can further prolong QT      Pulm:   PE  o VTE heparin gtt   Diagnosis: Acute Hypoxic Resp Failure   o Plan: secondary to cardiac arrest  o SBT and attempt to extubate today       GI:    Diagnosis: GERD  o Plan: home PPI  o Bowel regimen       :    Diagnosis: MUSTAPHA   o Plan: POA, baseline 0 8  o Trend UOP - oliguria improved with volume resuscitation   o Maintain reyez   o Trend creat       F/E/N:    Plan: NPO - advance diet today    Bicarb gtt - DC and change to isolyte since acidosis resolved    Replete lytes aggressively, PRN       Heme/Onc:    Diagnosis: PE  o Plan: no evidence of bleeding  o Heparin gtt VTE protocol   o SCDs for prophylaxis  o BLE Venous duplex negative for DVT       Endo:    Diagnosis: DM2   o Plan: hold home metformin   o SSI   o A1C 6   o Goal glucose 80 -180       ID:    Diagnosis: UTI POA   o Plan: urine culture pending   o Zosyn day 2  o MRSA, blood cultures pending   o COVID negative      MSK/Skin:    Diagnosis:  o Plan: local skin care  o Frequent turns and repositioning   o PT/OT      Disposition: Continue Critical Care   Code Status: Level 2 - DNAR: but accepts endotracheal intubation  ---------------------------------------------------------------------------------------  ICU CORE MEASURES    Prophylaxis   VTE Pharmacologic Prophylaxis: Heparin Drip  VTE Mechanical Prophylaxis: sequential compression device  Stress Ulcer Prophylaxis: Pantoprazole IV     ABCDE Protocol (if indicated)  Plan to perform spontaneous awakening trial today? Yes  Plan to perform spontaneous breathing trial today? Yes  Obvious barriers to extubation? No      Invasive Devices Review  Invasive Devices     Central Venous Catheter Line            CVC Central Lines 21 Triple Right Subclavian less than 1 day          Peripheral Intravenous Line            Peripheral IV 21 Right Antecubital less than 1 day    Peripheral IV 21 Right Hand less than 1 day          Arterial Line            Arterial Line 21 Left Radial less than 1 day          Drain            NG/OG/Enteral Tube Orogastric 18 Fr Center mouth less than 1 day    Urethral Catheter Temperature probe 16 Fr  less than 1 day          Airway            ETT  Cuffed 7 mm 1 day    ETT  Oral 7 mm less than 1 day              Can any invasive devices be discontinued today? No  ---------------------------------------------------------------------------------------  OBJECTIVE    Vitals   Vitals:    05/15/21 0200 05/15/21 0320 05/15/21 0330 05/15/21 0345   BP:       BP Location:       Pulse: 76  78 80   Resp: 18  18 18   Temp: 99 °F (37 2 °C)  98 6 °F (37 °C) 98 6 °F (37 °C)   TempSrc:       SpO2: 100% 100% 100% 100%   Weight:         Temp (24hrs), Av 5 °F (35 8 °C), Min:92 8 °F (33 8 °C), Max:99 °F (37 2 °C)  Current: Temperature: 98 6 °F (37 °C)      Respiratory:  SpO2: SpO2: 100 %       Invasive/non-invasive ventilation settings   Respiratory    Lab Data (Last 4 hours)      05/15 0046            pH, Arterial       7 426           pCO2, Arterial       22 4           pO2, Arterial       207 6           HCO3, Arterial       14 4           Base Excess, Arterial       -8 4                O2/Vent Data        2330   Most Recent         Vent Mode AC/PC  AC/PC      Resp Rate (BPM) (BPM) 18  18      Pressure Control (cmH2O) (cm) 16  16      Insp Time (sec) (sec) 0 8  0 8      FiO2 (%) (%) 60  50      PEEP (cmH2O) (cmH2O) 8  8      MV 10 6  9 81                  Physical Exam  HENT:      Head: Normocephalic     Eyes:      General:         Right eye: No discharge  Left eye: No discharge  Pupils: Pupils are equal, round, and reactive to light  Neck:      Musculoskeletal: No neck rigidity  Cardiovascular:      Rate and Rhythm: Normal rate and regular rhythm  Pulses: Normal pulses  Heart sounds: Normal heart sounds  Pulmonary:      Effort: Pulmonary effort is normal  No respiratory distress  Breath sounds: Normal breath sounds  No wheezing  Abdominal:      General: There is no distension  Palpations: Abdomen is soft  Tenderness: There is no abdominal tenderness  Musculoskeletal:         General: No swelling or deformity  Skin:     General: Skin is warm and dry  Neurological:      Mental Status: She is alert  Comments:  Follows some simple commands, moves all extremities bilaterally with equal strength         Laboratory and Diagnostics:  Results from last 7 days   Lab Units 05/14/21  2123 05/14/21  1844 05/14/21  1527 05/14/21  1505 05/14/21  1309   WBC Thousand/uL 18 96* 23 87* 16 13*  --  17 57*   HEMOGLOBIN g/dL 10 4* 11 3* 11 3*  --  10 3*   I STAT HEMOGLOBIN g/dl  --   --   --  11 2*  --    HEMATOCRIT % 32 7* 36 1 35 8  --  31 9*   HEMATOCRIT, ISTAT %  --   --   --  33*  --    PLATELETS Thousands/uL 187 226 204  --  187   NEUTROS PCT % 87* 85*  --   --   --    BANDS PCT %  --   --   --   --  2   MONOS PCT % 8 9  --   --   --    MONO PCT %  --   --   --   --  1*     Results from last 7 days   Lab Units 05/15/21  0046 05/14/21  1844 05/14/21  1527 05/14/21  1505 05/14/21  1309   SODIUM mmol/L 147* 147* 147*  --  150*   POTASSIUM mmol/L 4 3 5 0 3 9  --  3 6   CHLORIDE mmol/L 116* 118* 118*  --  116*   CO2 mmol/L 16* 11* 13*  --  17*   CO2, I-STAT mmol/L  --   --   --  18*  --    ANION GAP mmol/L 15* 18* 16*  --  17*   BUN mg/dL 33* 35* 32*  --  27*   CREATININE mg/dL 1 77* 1 85* 1 68*  --  1 72*   CALCIUM mg/dL 8 1* 8 8 8 1*  --  7 2*   GLUCOSE RANDOM mg/dL 166* 180* 205*  --  229*   ALT U/L  --   --   --   -- 125*   AST U/L  --   --   --   --  253*   ALK PHOS U/L  --   --   --   --  98   ALBUMIN g/dL  --   --   --   --  1 8*   TOTAL BILIRUBIN mg/dL  --   --   --   --  0 33     Results from last 7 days   Lab Units 05/15/21  0046 05/14/21  1844 05/14/21  1527 05/14/21  1309   MAGNESIUM mg/dL 3 0* 3 7* 2 1 1 7   PHOSPHORUS mg/dL 2 6 4 6* 4 6*  --       Results from last 7 days   Lab Units 05/14/21 2123 05/14/21  1844 05/14/21  1527 05/14/21  1309   INR  1 35* 1 35* 1 36* 1 49*   PTT seconds 27 30 32 35      Results from last 7 days   Lab Units 05/14/21 2111 05/14/21  1844 05/14/21  1527 05/14/21  1309   TROPONIN I ng/mL 15 50* 8 06* 2 48* 0 64*     Results from last 7 days   Lab Units 05/15/21  0046 05/14/21 2111 05/14/21  1844 05/14/21  1527 05/14/21  1309   LACTIC ACID mmol/L 5 8* 7 2* 9 3* 7 8* 6 5*     ABG:  Results from last 7 days   Lab Units 05/15/21  0046   PH ART  7 426   PCO2 ART mm Hg 22 4*   PO2 ART mm Hg 207 6*   HCO3 ART mmol/L 14 4*   BASE EXC ART mmol/L -8 4   ABG SOURCE  Line, Arterial     VBG:  Results from last 7 days   Lab Units 05/15/21  0046   ABG SOURCE  Line, Arterial     Results from last 7 days   Lab Units 05/14/21 2111   PROCALCITONIN ng/ml 4 41*       Micro  Results from last 7 days   Lab Units 05/14/21  1921 05/14/21  1731 05/14/21  1309   BLOOD CULTURE  Received in Microbiology Lab  Culture in Progress  Received in Microbiology Lab  Culture in Progress  Received in Microbiology Lab  Culture in Progress  EKG:   Imaging:  I have personally reviewed pertinent reports  Intake and Output  I/O       05/13 0701 - 05/14 0700 05/14 0701 - 05/15 0700    P  O   0    I V  (mL/kg)  2201 2 (38 2)    NG/GT  0    IV Piggyback  350    Total Intake(mL/kg)  2551 2 (44 3)    Urine (mL/kg/hr)  107    Emesis/NG output  0    Stool  0    Total Output  107    Net  +2444  2          Unmeasured Stool Occurrence  0 x        UOP:  ml/hr     Height and Weights         There is no height or weight on file to calculate BMI  Weight (last 2 days)     Date/Time   Weight    05/14/21 1625   57 6 (126 99)                Nutrition       Diet Orders   (From admission, onward)             Start     Ordered    05/14/21 1515  Diet NPO  Diet effective now     Question Answer Comment   Diet Type NPO    RD to adjust diet per protocol? Yes        05/14/21 1517              TF currently running at  ml/hr with a goal of  ml/hr  Formula:        Active Medications  Scheduled Meds:  Current Facility-Administered Medications   Medication Dose Route Frequency Provider Last Rate    aspirin  81 mg Oral Daily YEIMY FLORES      chlorhexidine  15 mL Mouth/Throat Q12H [address] Ruthie Ferrera PA-C      fentaNYL  25 mcg/hr Intravenous Continuous Camilla Hum, CRNP 25 mcg/hr (05/14/21 2211)    fentanyl citrate (PF)  100 mcg Intravenous Once Camilla Hum, CRNP      fentanyl citrate (PF)  50 mcg Intravenous Q1H PRN YEIMY FLORES      heparin (porcine)  3-30 Units/kg/hr (Order-Specific) Intravenous Titrated Singh Chao MD 18 Units/kg/hr (05/14/21 2142)    heparin (porcine)  2,200 Units Intravenous Q1H PRN Singh Chao MD      heparin (porcine)  4,400 Units Intravenous Q1H PRN Singh Chao MD      insulin lispro  1-5 Units Subcutaneous Q6H [address] Ruthie Ferrera PA-C      Labetalol HCl  10 mg Intravenous Q6H PRN Camilla Hum, CRNP      levETIRAcetam  500 mg Intravenous Q12H [address] Ruthie Ferrera PA-C Stopped (05/14/21 2116)    norepinephrine  1-30 mcg/min Intravenous Titrated YEIMY FLORES Stopped (05/14/21 2036)    omeprazole (PRILOSEC) suspension 2 mg/mL  20 mg Oral Early Morning Sabine Ling PA-C      piperacillin-tazobactam  3 375 g Intravenous Q8H Sabine Ling PA-C 3 375 g (05/15/21 0040)    propofol  5-50 mcg/kg/min Intravenous Titrated YEIMY FLORES 10 mcg/kg/min (05/14/21 1800)    senna-docusate sodium  1 tablet Oral BID Sabine Kudo, PA-C      sodium bicarbonate infusion  100 mL/hr Intravenous Continuous [place] 100 mL/hr (05/14/21 1827)    vasopressin  0 03 Units/min Intravenous Continuous YEIMY FLORES Stopped (05/14/21 2127)     Continuous Infusions:  fentaNYL, 25 mcg/hr, Last Rate: 25 mcg/hr (05/14/21 2211)  heparin (porcine), 3-30 Units/kg/hr (Order-Specific), Last Rate: 18 Units/kg/hr (05/14/21 2142)  norepinephrine, 1-30 mcg/min, Last Rate: Stopped (05/14/21 2036)  propofol, 5-50 mcg/kg/min, Last Rate: 10 mcg/kg/min (05/14/21 1800)  sodium bicarbonate infusion, 100 mL/hr, Last Rate: 100 mL/hr (05/14/21 1827)  vasopressin, 0 03 Units/min, Last Rate: Stopped (05/14/21 2127)      PRN Meds:   fentanyl citrate (PF), 50 mcg, Q1H PRN  heparin (porcine), 2,200 Units, Q1H PRN  heparin (porcine), 4,400 Units, Q1H PRN  Labetalol HCl, 10 mg, Q6H PRN        Allergies   Not on File  ---------------------------------------------------------------------------------------  Advance Directive and Living Will:      Power of :    POLST:    ---------------------------------------------------------------------------------------  Care Time Delivered:   Upon my evaluation, this patient had a high probability of imminent or life-threatening deterioration due to PE, encephalopathy, AHRF, Hypotension , which required my direct attention, intervention, and personal management  I have personally provided 20 minutes (4:40 to 5) of critical care time, exclusive of procedures, teaching, family meetings, and any prior time recorded by providers other than myself  FLAKO Campos      Portions of the record may have been created with voice recognition software  Occasional wrong word or "sound a like" substitutions may have occurred due to the inherent limitations of voice recognition software    Read the chart carefully and recognize, using context, where substitutions have occurred

## 2021-05-15 NOTE — RESPIRATORY THERAPY NOTE
RT Ventilator Management Note  Jayson Del Angel 79 y o  female MRN: [id number]  Unit/Bed#: [facility] 555-98 Encounter: [id number]      Daily Screen       5/14/2021  1155 5/14/2021  1400          Patient safety screen outcome[de-identified]  Failed  Failed      Not Ready for Weaning due to[de-identified]  Underline problem not resolved  Underline problem not resolved              Physical Exam:   Assessment Type: Assess only  General Appearance: Sedated  Respiratory Pattern: Assisted  Chest Assessment: Chest expansion symmetrical  Bilateral Breath Sounds: Clear      Resp Comments: Pt has been stable on ac/pc settings through the night  No respiratory distress noted  No changes made to vent at this time   Will continue to moniter pt per protocol

## 2021-05-15 NOTE — PROGRESS NOTES
Attending note  - Critical Slava Lehman  female MRN: [id number]  Unit/Bed#: [facility] 514-01 Encounter: [id number]    Patient was transferred from [address] [place] [address] yesterday after cardiac arrest with reported 52 minutes downtime  She was on significant amount of vasopressor support with a pH of 7 0 on I-STAT upon arrival   Patient underwent subsequent resuscitation and temporary transvenous pacemaker placement  Her metabolic derangements improved  She continued with variable AV block  Prior to mobilization to CT scan to evaluate for CNS process patient woke up and follow commands  Therapeutic temperature management was transition to maintaining normothermia  Overnight her metabolic derangements continued to improve and she was weaned off of vasopressors  Lactic acid has been improving  Ejection fraction on formal echocardiogram 35%      Vitals:    05/15/21 0630   BP:    Pulse: 90   Resp: 13   Temp: 99 °F (37 2 °C)   SpO2: 98%     GEN: NAD, awake and alert  HEENT: EOMI, PERRLA,  Endotracheal tube in place  CV: RRR, no  Murmur, normal sinus rhythm on telemetry  Resp:  CTA, no R/R/W  GI: soft,NT/ND  : urinary catheter in place  Neuro: non focal motor exam, CN symmetric, normal speech  Skin: warm, dry, skin breakdown sternum/anterior chest wall secondary to chest compressions    Cardiac arrest-unclear etiology differential includes primary cardiac event versus pulmonary embolism versus metabolic etiology resulting in cardiac arrest  Shock-undifferentiated cardiogenic versus obstructive versus septic  Cardiac arrhythmia with variable block  Acute hypoxic respiratory failure  Acute encephalopathy-multifactorial metabolic cannot exclude CNS etiology  Metabolic acidosis/lactic acidosis  Acute kidney injury  Hypokalemia  Hypomagnesemia  Urinary tract infection  Hyperglycemia with diabetes mellitus type 2-glycemic control with insulin protocol  Seizure disorder  Coronary artery disease with history of CABG 14 years ago  Hypertension history  GERD history    Patient maintained on ventilator overnight  Attempt spontaneous breathing trial this morning  Assess for possibility of extubation  Maintain oxygen saturation greater than 92%  Aggressive airway clearance protocol  Etiology of cardiac arrest still unclear  Continue with heparin infusion for possibility of PULMONARY EMBOLISM  Bilateral venous duplex is pending  Repeat echocardiogram in 48 hours to assess recovery of right ventricular function  Vasopressors are now weaned off  Continue to monitor endpoints resuscitation  Patient has lactic acid of 4 2 which is improved from presentation  Continue to trend  Patient's creatinine currently 1 8 which appears to be slightly increased from presentation  Continue to trend BUN and creatinine as well as urine output  Update:  Patient tolerated spontaneous breathing trial and was successfully extubated  She is able to talk post event and did not recall anything that occurred yesterday  She denied having recent chest pain, shortness of breath, headaches or seizure activity  When she was as further regarding seizure activity she is not able to provide a history in regards to what her seizures resemble  She said she has been taking her home medications  Patient did have an admission in August of 2020 to [facility] for seizure which is when she was started on Keppra  Patient reports he had a history of seizures as a child  MRI and workup at that time were negative  Patient did have a UTI as well as acute kidney injury  Baseline creatinine appears to be between 1 4 through 1 8  Plan to decrease IV fluids to 50 mL/hour  Assess swallowing evaluation after extubation and discontinue IV fluids  EKG obtained with ST elevations isolated to lead 3 as well as some ST abnormalities V1 through V3 no reciprocal changes identified    EKG reviewed with Cardiology and due to no reciprocal changes, status post cardiac arrest without chest pain continue to monitor patient and treat medically  Venous duplex identified bilateral DVT, report states nonocclusive and occlusive chronic versus subacute  Regardless both are above the knee DVTs coninue with anticoagulation plan as there is concern patient may have had a PULMONARY EMBOLISM with her right ventricular findings  Plan to repeat limited echocardiogram tomorrow to assess RV function and improvement of the patient is off vasopressors  Continue antibiotics for urine concerning for UTI  Cultures and sensitivities pending  Recheck labs this afternoon and adjust laboratory schedule  Presently patient is at q 6 hours if she remains hemodynamically appropriate will not need serial labs      Critical care time 49 minutes

## 2021-05-15 NOTE — RESPIRATORY THERAPY NOTE
RT Ventilator Management Note  Sanjiv Viveros 79 y o  female MRN: [id number]  Unit/Bed#: [facility] 658-43 Encounter: [id number]      Daily Screen       5/14/2021  1400 5/15/2021  0819          Patient safety screen outcome[de-identified]  Failed  Passed      Not Ready for Weaning due to[de-identified]  Underline problem not resolved  --      Spont breathing trial outcome[de-identified]  --  Passed      Preparing to extubate/ Notify Nurse:  --  Yes      Extubation order obtained:  --  Yes              Physical Exam:   Assessment Type: Assess only  General Appearance: Sedated  Respiratory Pattern: Assisted  Chest Assessment: Chest expansion symmetrical      Resp Comments: (P) Pt  awake  BS clear, no rx needed at this time

## 2021-05-15 NOTE — PROGRESS NOTES
Patient with hypotension again, recheck POCUS with slight improvement in RV function  Levophed restarted and titrated to 15mcg  Vasopressin added  Levophed quickly weaned to off  Remain on vasopressin  Check TSH and cortisol  Unclear etiology of initial event and subsequent new hypotension  Differential diagnosis PULMONARY EMBOLISM vs unintentional polypharmacy OD vs hypothyroid/AI  Patient daughter updated at bedside   Family update and procedures do not include cc time    CC time 38min

## 2021-05-16 NOTE — PROGRESS NOTES
Cardiology Progress Note - Benjamin Bravo 79 y o  female MRN: [id number]    Unit/Bed#: [facility] 094-21 Encounter: [id number]    Assessment and plan  1  PEA cardiac arrest  2  Severe RV dysfunction suspect secondary to pulmonary embolism  3  Bilateral lower extremity DVT  4  Preserved LV function with severe LVH  5  Coronary disease history of CABG stable without angina  6  Non MI elevated troponin  7  Transient complete heart block due to metabolic derangements transvenous pacer in place but turned off  8  Acute kidney injury  9  Lactic acidosis    Recommendations:  Patient seen and examined this morning as well as last evening I came in around 2:00 a m  And performed a bedside echocardiogram on the patient and discussed the case extensively with critical care  Last evening during the decompensation she had episodes of complete heart block preceding this which dropped her heart rate 110-60  I think this led to some of the acute decompensation given her severe RV dysfunction  CT scan was done last evening no evidence of acute pulmonary embolism  Left heart catheterization was done this morning all grafts are open and native vessels are occluded  No evidence of acute coronary syndrome to explain her significant current illness  Persistent lactic acidosis is concerning  Possibility of ischemic gut, although exam does not point not direction  We need to work on lowering her right atrial pressures to help coronary perfusion pressure  When she was intubated this morning and received further bicarbonate arterial pressures had sharon significantly and she seemed to do better  Agree with dialysis today would try to pull off some volume as blood pressure will allow  Significant hepatic congestion with shock liver  Continue Milrinone for RV support  Continue full anticoagulation for DVT  Given transient complete heart block continue transvenous pacer at a heart rate of 90      There was a new finding this morning where the patient was unable to move both of her legs  She had profound weakness this was discussed with critical care team   Good Doppler pulses bilaterally doubt vascular insufficiency question neurologic event  Subjective:    No significant events overnight  I spoke to the patient at 2:00 a m  Last night as well as 8:00 a m  This morning she has not had any anginal sounding chest pain  Unfortunately today she did complain that she was unable to move her legs this was prior to any cardiac intervention  ROS    Objective:   Vitals: Blood pressure (!) 79/54, pulse 98, temperature (!) 94 6 °F (34 8 °C), resp  rate (!) 24, weight 60 6 kg (133 lb 9 6 oz), SpO2 94 %  , There is no height or weight on file to calculate BMI ,   Orthostatic Blood Pressures      Most Recent Value   Blood Pressure  (!) 79/54 filed at 05/16/2021 0733   Patient Position - Orthostatic VS  Lying filed at 05/15/2021 4632         Systolic (55THA), ZBT:744 , Min:63 , SRM:750     Diastolic (91NGF), XOV:00, Min:48, Max:124      Intake/Output Summary (Last 24 hours) at 5/16/2021 0940  Last data filed at 5/16/2021 0907  Gross per 24 hour   Intake 4770 29 ml   Output 538 ml   Net 4232 29 ml     Weight (last 2 days)     Date/Time   Weight    05/15/21 0542   60 6 (133 6)    05/15/21 0541   60 6 (133 6)    05/14/21 1625   57 6 (126 99)                Telemetry Review: No significant arrhythmias seen on telemetry review  EKG personally reviewed by Zahra Villar DO  Physical Exam:  Vital signs reviewed  General:  Alert and cooperative, appears stated age, no acute distress  HEENT:  PERRLA, EOMI, no scleral icterus, no conjunctival pallor  Neck:  No lymphadenopathy, no thyromegaly, no carotid bruits, no elevated JVP  Heart:  Regular rate and rhythm, normal S1/S2, no S3/S4, no murmur, rubs or gallops    PMI nondisplaced  Lungs:  Clear to auscultation bilaterally, no wheezes rales or rhonchi  Abdomen:  Soft, non-tender, positive bowel sounds, no rebound or guarding,   no organomegaly   Extremities:  Normal range of motion    No clubbing, cyanosis or edema   Vascular:  2+ pedal pulses  Skin:  No rashes or lesions on exposed skin  Neurologic:  Cranial nerves II-XII grossly intact without focal deficits  Psych:  Normal mood and affect      Laboratory Results:  Results from last 7 days   Lab Units 05/16/21  0409 05/15/21  1656 05/15/21  1338   TROPONIN I ng/mL >40 00* >40 00* >40 00*       CBC with diff:   Results from last 7 days   Lab Units 05/16/21  0439 05/15/21  1008 05/15/21  0436 05/14/21  2123 05/14/21  1844 05/14/21  1527 05/14/21  1505 05/14/21  1309   WBC Thousand/uL 15 11*  --  16 90* 18 96* 23 87* 16 13*  --  17 57*   HEMOGLOBIN g/dL 8 5*  --  10 1* 10 4* 11 3* 11 3*  --  10 3*   I STAT HEMOGLOBIN g/dl  --  9 9*  --   --   --   --  11 2*  --    HEMATOCRIT % 25 7*  --  30 6* 32 7* 36 1 35 8  --  31 9*   HEMATOCRIT, ISTAT %  --  29*  --   --   --   --  33*  --    MCV fL 99*  --  97 99* 101* 101*  --  100*   PLATELETS Thousands/uL 169  --  187 187 226 204  --  187   MCH pg 32 7  --  32 1 31 6 31 7 31 8  --  32 2   MCHC g/dL 33 1  --  33 0 31 8 31 3* 31 6  --  32 3   RDW % 15 6*  --  14 8 15 0 15 0 14 8  --  14 6   MPV fL 10 9  --  11 1 10 8 10 6 10 5  --  10 4   NRBC AUTO /100 WBCs 0  --  0 0 0 0  --   --          CMP:  Results from last 7 days   Lab Units 05/16/21  0545 05/16/21  0456 05/16/21  0208 05/15/21  2338 05/15/21  1805 05/15/21  1338 05/15/21  1202 05/15/21  1010 05/15/21  1008 05/15/21  0436  05/14/21  1505  05/14/21  1309   POTASSIUM mmol/L 3 8  --  3 8 3 8 4 5 4 5  --  4 2  --  4 0   < >  --   --  3 6   CHLORIDE mmol/L 109*  --  110* 107 110* 113*  --  114*  --  114*   < >  --   --  116*   CO2 mmol/L 13*  --  13* 13* 13* 13*  --  16*  --  19*   < >  --   --  17*   CO2, I-STAT mmol/L  --   --   --   --   --   --   --   --  18*  --   --  18*   < >  --    BUN mg/dL 37*  --  36* 38* 37* 33*  --  33*  --  32*   < >  --   --  27*   CREATININE mg/dL 2 40*  --  2 30* 2 39* 2 30* 2 16*  --  1 92*  --  1 81*   < >  --   --  1 72*   GLUCOSE, ISTAT mg/dl  --   --   --   --   --   --   --   --  135  --   --  208*  --   --    CALCIUM mg/dL 6 8*  --  7 4* 8 0* 8 0* 8 3  --  8 7  --  8 8   < >  --   --  7 2*   AST U/L  --  3,559*  --   --   --   --  299*  --   --   --   --   --   --  253*   ALT U/L  --  1,465*  --   --   --   --  105*  --   --   --   --   --   --  125*   ALK PHOS U/L  --  73  --   --   --   --  91  --   --   --   --   --   --  98   EGFR ml/min/1 73sq m 20  --  21 20 21 23  --  26  --  28   < >  --   --  30    < > = values in this interval not displayed  BMP:  Results from last 7 days   Lab Units 05/16/21  0545 05/16/21  0208 05/15/21  2338 05/15/21  1805 05/15/21  1338 05/15/21  1010 05/15/21  1008 05/15/21  0436   POTASSIUM mmol/L 3 8 3 8 3 8 4 5 4 5 4 2  --  4 0   CHLORIDE mmol/L 109* 110* 107 110* 113* 114*  --  114*   CO2 mmol/L 13* 13* 13* 13* 13* 16*  --  19*   CO2, I-STAT mmol/L  --   --   --   --   --   --  18*  --    BUN mg/dL 37* 36* 38* 37* 33* 33*  --  32*   CREATININE mg/dL 2 40* 2 30* 2 39* 2 30* 2 16* 1 92*  --  1 81*   GLUCOSE, ISTAT mg/dl  --   --   --   --   --   --  135  --    CALCIUM mg/dL 6 8* 7 4* 8 0* 8 0* 8 3 8 7  --  8 8       BNP: No results for input(s): BNP in the last 72 hours      Magnesium:   Results from last 7 days   Lab Units 05/16/21  0545 05/16/21  0442 05/15/21  0436 05/15/21  0046 05/14/21  1844 05/14/21  1527 05/14/21  1309   MAGNESIUM mg/dL 2 6 2 7* 2 8* 3 0* 3 7* 2 1 1 7       Coags:   Results from last 7 days   Lab Units 05/16/21  0545 05/15/21  1956 05/15/21  1202 05/15/21  0339 05/14/21  2123 05/14/21  1844 05/14/21  1527 05/14/21  1309   PTT seconds 87* >210* 191* >210* 27 30 32 35   INR   --   --   --   --  1 35* 1 35* 1 36* 1 49*       TSH:        Hemoglobin A1C   Results from last 7 days   Lab Units 05/14/21  1844   HEMOGLOBIN A1C % 6 3*       Lipid Profile:       Cardiac testing:   No results found for this or any previous visit  No results found for this or any previous visit  No results found for this or any previous visit  No results found for this or any previous visit  Meds/Allergies   all current active meds have been reviewed  No medications prior to admission  fentaNYL, 75 mcg/hr, Last Rate: 75 mcg/hr (05/16/21 0917)  heparin (porcine), 3-30 Units/kg/hr (Order-Specific), Last Rate: Stopped (05/16/21 0836)  milrinone (PRIMACOR) infusion, 0 25 mcg/kg/min, Last Rate: 0 25 mcg/kg/min (05/15/21 2318)  norepinephrine, 1-30 mcg/min, Last Rate: 25 mcg/min (05/16/21 0755)  NxStage K 4/Ca 3, 20,000 mL  sodium bicarbonate infusion, 100 mL/hr, Last Rate: 100 mL/hr (05/15/21 2246)  vasopressin, 0 04 Units/min, Last Rate: 0 04 Units/min (05/16/21 0714)      Assessment:  Principal Problem:    Cardiac arrest (HCC)  Active Problems:    Positive D dimer    Elevated troponin    Encephalopathy acute    MUSTAPHA (acute kidney injury) ([facility] [facility] )    Metabolic acidosis    Lactic acidosis    Epilepsy ([facility] [address] )    Depression    Shock ([facility] )    CAD (coronary artery disease)    Hypertension    Arrhythmia    Acute respiratory failure with hypoxia ([facility] )    Anemia    UTI (urinary tract infection)    Type 2 diabetes mellitus ([facility] )            Counseling / Coordination of Care  Total floor / unit time spent today 25 minutes  Greater than 50% of total time was spent with the patient and / or family counseling and / or coordination of care  A description of the counseling / coordination of care: Mat Yu

## 2021-05-16 NOTE — PROCEDURES
Temporary HD Catheter    Date/Time: 5/16/2021 5:12 AM  Performed by: David Morrison MD  Authorized by: David Morrison MD     Patient location:  Other (comment) (ICU)  Other Assisting Provider: Yes (comment) (Pierre Stack)    Consent:     Consent obtained:  Written (From daughter over phone  Also discussed with patient )    Consent given by:  Guardian    Risks discussed:  Arterial puncture, incorrect placement, nerve damage, pneumothorax, infection and bleeding    Alternatives discussed:  No treatment and delayed treatment (Limiting goals of care)  Universal protocol:     Procedure explained and questions answered to patient or proxy's satisfaction: yes      Relevant documents present and verified: yes      Test results available and properly labeled: yes      Radiology Images displayed and confirmed  If images not available, report reviewed: yes      Patient identity confirmed:  Hospital-assigned identification number, verbally with patient and arm band  Pre-procedure details:     Hand hygiene: Hand hygiene performed prior to insertion      Sterile barrier technique: All elements of maximal sterile technique followed      Skin preparation:  2% chlorhexidine    Skin preparation agent: Skin preparation agent completely dried prior to procedure    Indications:     Central line indications: dialysis    Sedation:     Sedation type: fentanyl 50 mcg  Anesthesia (see MAR for exact dosages): Anesthesia method:  Local infiltration (6 mL 1% lidocaine)  Procedure details:     Location:  Left internal jugular    Vessel type: vein      Laterality:  Left    Approach: percutaneous technique used      Patient position: SItting up      Catheter type:  Double lumen    Catheter size:  13 5 Fr    Catheter length:  20 cm    Landmarks identified: yes      Ultrasound guidance: yes      Ultrasound image availability:  Images available in PACS    Sterile ultrasound techniques: Sterile gel and sterile probe covers were used      Number of attempts:  2    Successful placement: yes      Vessel of catheter tip end:  18 cm at skin  Post-procedure details:     Post-procedure:  Dressing applied and line sutured    Assessment:  Blood return through all ports, no pneumothorax on x-ray, placement verified by x-ray and free fluid flow    Patient tolerance of procedure: Tolerated well, no immediate complications  Comments:      First attempt with resistance to passage of wire at 20 cm - wire withdrawn  Second attempt with easy passage of wire past 40 cm  Procedure done in sitting position 2/2 loss of pacer capture when patient flat

## 2021-05-16 NOTE — BRIEF OP NOTE (RAD/CATH)
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

## 2021-05-16 NOTE — PROGRESS NOTES
Critical Care Interval Progress Note:   Vidal Mcmanus 79 y o  female MRN: [id number]    Unit/Bed#: [facility] 793-86 Encounter: [id number]    Summary of Critical Care:    Patient became acutely hypotensive again requiring re-initiation of levo and vaso  Pacer reprogrammed to rate of 100 bpm  Lactic acid remains elevated and patient is noted to be oliguric, making 10 ml/hr for the past 5 hours with no improvement despite interventions tonight  Cardiology came to assess patient at bedside and interventional cardiology notified  Discussion between Dr Harrell and Dr Oleksandr Castro regarding her history of CABG in 2007 and new ST changes this admission with a troponin of > 40, RV dysfunction and now cardiogenic shock  Cardiology recommending CTA to rule out PE before proceeding with cardiac cath intervention  CTA has been deferred related to MUSTAPHA this admission however patient has now become severely oliguric with worsening lactic acidosis  Due to continued clinical decline, Dr Oleksandr Castro reached out to her daughter, Steve Mccracken, and described current clinical condition and options which include CTA to rule out PE with understanding that we would need to start dialysis immediately following this and that it is uncertain if her kidneys would recover to allow her to be liberated from dialysis in the future  She verbalizes understanding and is in agreement with proceeding with CTA PE study and CVVH line placement for CVVH to follow  I discussed with Dr Gregor Demarco from radiology and Dr Tracee Adan from nephrology  Counseling / Coordination of Care: Total Critical Care time spent 30 minutes excluding procedures, teaching and family updates

## 2021-05-16 NOTE — PROGRESS NOTES
Patient's daughter Linsey Ramos called regarding patient's clinical status  Despite maximal medical therapy with hemodynamic support of vasopressin 30 mcg, vasopressin 0 04 units, Milrinone 0 25 mcg and bicarbonate infusion patient still remains hemodynamically labile  She was also started on stress dose steroids as well as given doses methylene blue without improvement in her hemodynamic status  Her lactic acid levels continue to elevate  She now is having roving eye movements and is not responsive to verbal or tactile stimuli  Patient has been on CVVH and acidosis continues to worsen  Patient's daughter was updated as to all the clinical events and concern for worsening overall status  Aggressiveness of care was discussed and patient's daughter was informed that she is on maximal life support and may not survive current hospitalization  Patient's daughter understands and is thankful for all the support and care that her mother is received and wishes to transition her mother to comfort measures only  Patient's daughter wishes to remember her mother as she saw her yesterday and will not be coming to the hospital   Plan to transition to comfort measures with extubation, continuing only comfort interventions and comfort measures        Critical care time 30 minutes

## 2021-05-16 NOTE — CONSULTS
Consultation - Nephrology   Sanjiv Viveros 79 y o  female MRN: [id number]  Unit/Bed#: [facility] 662-72 Encounter: [id number]    ASSESSMENT AND PLAN:  Patient is 15-year-old female with significant medical issues of CAD, status post CABG, hypertension, GERD, presented from outside [facility] after cardiac arrest   Apparently she had 52 minutes of CPR reported  We are consulted for MUSTAPHA/CKD management  Worsening MUSTAPHA on CKD stage IIIB, baseline creatinine 1 6 to 1 7 since August 2020  -creatinine now worsening up to 2 3  -MUSTAPHA likely secondary to ischemic ATN, suspected cardiogenic shock, in the setting of prolonged cardiac arrest, also status post IV contrast with CT scan on 5/16/21   -urine output remains very poor  -given overall worsening oligo anuric MUSTAPHA, persistent and severe metabolic/lactic acidosis despite being on bicarb drip, about 6 5 L positive balance in the setting of suspected cardiogenic shock and CT scan concerning for pulmonary congestion, will start emergent CRRT  -I had detailed discussion with patient's daughter Mindy Valentine over the phone  Explained worsening renal failure and need for dialysis  She is agreeable for dialysis and provided consent over the phone which is in the chart  Risks versus benefit discussed  She verbalized understanding of my discussion with her   -closely monitor intake and output  Avoid nephrotoxins or NSAIDs   -UA this admission shows 4 to 10 RBCs, 1+ proteinuria, innumerable WBCs, moderate bacteria  -CT scan on admission shows no obvious obstructive uropathy, mild nonspecific bilateral perinephric stranding  Severe high anion gap metabolic/lactic acidosis and component of respiratory alkalosis  -persistently elevated lactic acid level with most recent 12 7, continue to trend   -currently on bicarb drip  -plan to start emergency CRRT as above   -continue to monitor acid-base status, monitor ABG  Mild hypernatremia, serum sodium corrected value around 147   Continue to monitor with dialysis  -likely in the setting poor free water intake  Anemia in the CKD, closely monitor, transfuse p r n  For hemoglobin less than seven    Severe shock, currently remains on vasopressors  ?  Cardiogenic, status post cardiac arrest  -echo shows EF 55%, severely reduced right ventricular systolic function  Small pericardial effusion although no evidence of hemodynamic compromise, moderate pulmonary hypertension   -cardiology follow-up  Status post cardiac arrest, CT angiogram shows no obvious PE  Hypoxic respiratory failure, currently remains on BiPAP    Discussed above plan in detail with ICU team     HISTORY OF PRESENT ILLNESS:  Requesting Physician: Maira Overton MD  Reason for Consult:  Nichol Ibarra is a 79y o  year old female who was admitted to [facility] after presenting with cardiac arrest  A renal consultation is requested today for assistance in the management of MUSTAPHA/CKD  Old medical records including prior creatinine values were reviewed from [facility] records and Care everywhere  Patient apparently was found down in cardiac arrest and started CPR  There was reported about 52 minutes of CPR time  Patient presented initially to outside [facility] and eventually transferred to [facility] for further management  Eventually had ROSC  Currently remains on multiple vasopressors, remains on BiPAP  She found to have worsening MUSTAPHA, very poor urine output along with severe acidosis prompting renal consultation  She is active, alert, able to answers some questions of the time of my encounter  She is overall confused and remains poor historian      PAST MEDICAL HISTORY:  Past Medical History:   Diagnosis Date    CAD (coronary artery disease), native coronary artery 2007    stent and CABG x 3     Cervical spinal stenosis     Gastric ulcer     GERD (gastroesophageal reflux disease)     HTN (hypertension)        PAST SURGICAL HISTORY:  Past Surgical History: Procedure Laterality Date    CORONARY ARTERY BYPASS GRAFT  2007    x3, using artery graft performed by Dr Randy Robert at [facility]       ALLERGIES:  Not on File    SOCIAL HISTORY:  Social History     Substance and Sexual Activity   Alcohol Use None    Comment: unable to obtain due to health status     Social History     Substance and Sexual Activity   Drug Use Not on file    Comment: unable to obtain due to health status     Social History     Tobacco Use   Smoking Status Current Every Day Smoker   Smokeless Tobacco Never Used   Tobacco Comment    unable to obtain full history due to acuity status       FAMILY HISTORY:  Family History   Problem Relation Age of Onset    Diabetes Mother     Cardiomyopathy Mother 36    Intracerebral hemorrhage Father 36    Hypertension Brother     Cardiomyopathy Brother 43        received cardiac transplant at age 43    Hypertension Brother     Coronary artery disease Brother 54       MEDICATIONS:    Current Facility-Administered Medications:     albumin human (FLEXBUMIN) 5 % injection **ADS Override Pull**, , , ,     aspirin chewable tablet 81 mg, 81 mg, Oral, Daily, Ruthie Ann PA-C, 81 mg at 05/15/21 0815    EPINEPHrine (ADRENALIN) 0 1 mg/mL injection **ADS Override Pull**, , , ,     EPINEPHrine PF (ADRENALIN) 1 mg/mL injection **ADS Override Pull**, , , ,     heparin (porcine) 25,000 units in 0 45% NaCl 250 mL infusion (premix), 3-30 Units/kg/hr (Order-Specific), Intravenous, Titrated, Disha Mendoza MD, Last Rate: 5 mL/hr at 05/15/21 2320, 9 Units/kg/hr at 05/15/21 2320    heparin (porcine) injection 2,200 Units, 2,200 Units, Intravenous, Q1H PRN, Disha Mendoza MD    heparin (porcine) injection 4,400 Units, 4,400 Units, Intravenous, Q1H PRN, Disha Mendoza MD    insulin lispro (HumaLOG) 100 units/mL subcutaneous injection 1-5 Units, 1-5 Units, Subcutaneous, 4x Daily (with meals and at bedtime) **AND** Fingerstick Glucose (POCT), , , 4x Daily AC and at bedtime, Leonel Hancock YEIMY Lewis    Labetalol HCl (NORMODYNE) injection 10 mg, 10 mg, Intravenous, Q6H PRN, FLAKO Palacios    levETIRAcetam (KEPPRA) 500 mg in sodium chloride 0 9 % 100 mL IVPB, 500 mg, Intravenous, Q12H [address], Ruthie Ann PA-C, Last Rate: 400 mL/hr at 05/15/21 2101, 500 mg at 05/15/21 2101    lidocaine (LIDODERM) 5 % patch 1 patch, 1 patch, Topical, Daily, Bridgette Gutierres PA-C, 1 patch at 05/15/21 1034    milrinone (PRIMACOR) 20 mg in 100 mL infusion (premix), 0 25 mcg/kg/min, Intravenous, Continuous, FLAKO Palacios, Last Rate: 4 5 mL/hr at 05/15/21 2318, 0 25 mcg/kg/min at 05/15/21 2318    norepinephrine (LEVOPHED) 4 mg (STANDARD CONCENTRATION) IV in sodium chloride 0 9% 250 mL, 1-30 mcg/min, Intravenous, Titrated, Bridgette Gutierres PA-C, Last Rate: 37 5 mL/hr at 05/16/21 0325, 10 mcg/min at 05/16/21 0325    oxyCODONE (ROXICODONE) IR tablet 2 5 mg, 2 5 mg, Oral, Q4H PRN **OR** oxyCODONE (ROXICODONE) IR tablet 5 mg, 5 mg, Oral, Q4H PRN, Bridgette Gutierres PA-C    pantoprazole (PROTONIX) EC tablet 40 mg, 40 mg, Oral, Early Morning, Katie Lewis PA-C    piperacillin-tazobactam (ZOSYN) 3 375 g in sodium chloride 0 9 % 100 mL IVPB, 3 375 g, Intravenous, Q8H, Bridgette Gutierres PA-C, Last Rate: 25 mL/hr at 05/16/21 0034, 3 375 g at 05/16/21 0034    senna-docusate sodium (SENOKOT S) 8 6-50 mg per tablet 1 tablet, 1 tablet, Oral, BID, Bridgette Gutierres PA-C, 1 tablet at 05/15/21 1716    sodium bicarbonate 150 mEq in dextrose 5 % 1,000 mL infusion, 100 mL/hr, Intravenous, Continuous, Bridgette Gutierres PA-C, Last Rate: 100 mL/hr at 05/15/21 2246, 100 mL/hr at 05/15/21 2246    vasopressin (PITRESSIN) 20 Units in sodium chloride 0 9 % 100 mL infusion, 0 04 Units/min, Intravenous, Continuous, Bridgette Gutierres PA-C, Last Rate: 12 mL/hr at 05/16/21 0105, 0 04 Units/min at 05/16/21 0105    REVIEW OF SYSTEMS:  More than 10 review of systems were attempted although overall very limited review of system as patient is overall confused  Does not recall all the events leading up to hospitalization  PHYSICAL EXAM:  Current Weight: Weight - Scale: 60 6 kg (133 lb 9 6 oz)  First Weight: Weight - Scale: 57 6 kg (126 lb 15 8 oz)  Vitals:    05/16/21 0440   BP:    Pulse: 98   Resp: 16   Temp: 97 9 °F (36 6 °C)   SpO2: 97%       Intake/Output Summary (Last 24 hours) at 5/16/2021 0458  Last data filed at 5/16/2021 0456  Gross per 24 hour   Intake 4369 96 ml   Output 433 ml   Net 3936 96 ml     Wt Readings from Last 3 Encounters:   05/15/21 60 6 kg (133 lb 9 6 oz)   05/14/21 56 4 kg (124 lb 5 4 oz)     Temp Readings from Last 3 Encounters:   05/16/21 97 9 °F (36 6 °C)   05/14/21 (!) 96 5 °F (35 8 °C) (Temporal)     BP Readings from Last 3 Encounters:   05/16/21 127/71   05/14/21 147/76     Pulse Readings from Last 3 Encounters:   05/16/21 98   05/14/21 82        Physical Examination:  General:  Lying in bed, on BiPAP  Eyes:  Mild conjunctival pallor present  ENT:  External examination of ears and nose and unremarkable  Neck:  No obvious lymphadenopathy appreciated  Respiratory:  Decreased breath sound at bases, coarse breath sound  CVS:  S1, S2 present  GI:  Soft, nondistended  CNS:  Active, alert oriented x1  Extremities:  No significant edema in legs  Psych:  Confused, active  Skin:  No new rash in legs    Invasive Devices:   Urethral Catheter Temperature probe 16 Fr   (Active)   Reasons to continue Urinary Catheter  Accurate I&O assessment in critically ill patients (48 hr  max) 05/15/21 2000   Goal for Removal Remove after 48 hrs of I/O monitoring 05/15/21 2000   Site Assessment Clean;Skin intact 05/15/21 2000   Collection Container Standard drainage bag 05/15/21 2000   Securement Method Securing device (Describe) 05/15/21 2000   Irrigant Normal saline 05/15/21 1327   Urethral Irrigation Intake (mL) 30 mL 05/15/21 1327   Output (mL) 25 mL 05/16/21 0456     Lab Results:   Results from last 7 days   Lab Units 05/16/21  8822 05/16/21  0439 05/16/21  0208 05/15/21  2338 05/15/21  1805 05/15/21  1338 05/15/21  1010 05/15/21  1008 05/15/21  0436 05/15/21  0046 05/14/21 2123 05/14/21  1844 05/14/21  1527 05/14/21  1505 05/14/21  1309   WBC Thousand/uL  --  15 11*  --   --   --   --   --   --  16 90*  --  18 96* 23 87* 16 13*  --  17 57*   HEMOGLOBIN g/dL  --  8 5*  --   --   --   --   --   --  10 1*  --  10 4* 11 3* 11 3*  --  10 3*   I STAT HEMOGLOBIN g/dl  --   --   --   --   --   --   --  9 9*  --   --   --   --   --  11 2*  --    HEMATOCRIT %  --  25 7*  --   --   --   --   --   --  30 6*  --  32 7* 36 1 35 8  --  31 9*   HEMATOCRIT, ISTAT %  --   --   --   --   --   --   --  29*  --   --   --   --   --  33*  --    PLATELETS Thousands/uL  --  169  --   --   --   --   --   --  187  --  187 226 204  --  187   POTASSIUM mmol/L  --   --  3 8 3 8 4 5 4 5 4 2  --  4 0 4 3  --  5 0 3 9  --  3 6   CHLORIDE mmol/L  --   --  110* 107 110* 113* 114*  --  114* 116*  --  118* 118*  --  116*   CO2 mmol/L  --   --  13* 13* 13* 13* 16*  --  19* 16*  --  11* 13*  --  17*   CO2, I-STAT mmol/L  --   --   --   --   --   --   --  18*  --   --   --   --   --  18*  --    BUN mg/dL  --   --  36* 38* 37* 33* 33*  --  32* 33*  --  35* 32*  --  27*   CREATININE mg/dL  --   --  2 30* 2 39* 2 30* 2 16* 1 92*  --  1 81* 1 77*  --  1 85* 1 68*  --  1 72*   CALCIUM mg/dL  --   --  7 4* 8 0* 8 0* 8 3 8 7  --  8 8 8 1*  --  8 8 8 1*  --  7 2*   MAGNESIUM mg/dL 2 7*  --   --   --   --   --   --   --  2 8* 3 0*  --  3 7* 2 1  --  1 7   PHOSPHORUS mg/dL 5 0*  --   --   --   --   --   --   --  2 9 2 6  --  4 6* 4 6*  --   --    GLUCOSE, ISTAT mg/dl  --   --   --   --   --   --   --  135  --   --   --   --   --  208*  --        Other Studies:   CTA chest pe study   Final Result by mOa Bliss DO (05/16 2548)      Some images are suboptimal secondary to respiratory motion which decreases sensitivity for evaluation of peripheral pulmonary emboli, subject to this, no pulmonary embolism is seen  Cardiomegaly, especially in the right heart chambers  Reflux of contrast into the IVC and hepatic veins suggesting increased right heart pressures  Small dependent pleural effusions with associated passive atelectasis  Patchy groundglass opacities in    the bilateral lobes, consider a component of fluid overload/CHF  Superimposed infection could be considered in the appropriate clinical setting  Pericardial effusion, coronary atherosclerosis, shotty mediastinal lymph nodes, body wall edema/anasarca, and other findings as above  Workstation performed: [id number]         CT head wo contrast   Final Result by Carlos Xiong MD (05/14 2048)         1  Microangiopathic and remote postischemic change without acute intracranial abnormality noted  Note that changes of hypoxic/ischemic encephalopathy may not manifest on CT imaging in the hyperacute phase, and appropriate clinical correlation and    follow-up imaging suggested  Workstation performed: [id number]         CT chest abdomen pelvis wo contrast   Final Result by Carlos Xiong MD (05/14 2113)         1  There is mild bowel wall thickening of the colon in a nonspecific manner which may reflect underlying colitis  Some suspected mild diffuse small bowel wall thickening noted as well  Given the patient's recent cardiac arrest, the possibility of an    ischemic colitis is raised  2   Stranding of the peripancreatic fat raising the possibility of underlying pancreatitis  3   Small amount of abdominal ascites with fluid in the sharlene hepatis and anterior pararenal space and subhepatic space of uncertain etiology, but possibly due to pancreatitis  No free air to suggest perforated viscus although this cannot be entirely    excluded  4   Nonspecific bilateral perinephric stranding  No obstructive uropathy  5   Slightly distended but otherwise unremarkable appearing gallbladder     6  Constipation  7   Cardiomegaly with small pericardial effusion  Transvenous pacing lead present  8   Additional findings as noted  The study was marked in [facility] for immediate notification  Workstation performed: [id number]         VAS lower limb venous duplex study, complete bilateral   Final Result by Michele Kebede MD (05/14 2228)      XR chest portable   Final Result by Patrice Valverde DO (05/15 0831)      No acute cardiopulmonary disease  Workstation performed: [id number]         XR chest portable   Final Result by Balwinder Sandy MD (05/14 1726)      No acute cardiopulmonary disease  Workstation performed: [id number]         XR chest portable ICU    (Results Pending)   Chest x-ray images personally reviewed which shows clear lungs    Portions of the record may have been created with voice recognition software  Occasional wrong word or "sound a like" substitutions may have occurred due to the inherent limitations of voice recognition software  Read the chart carefully and recognize, using context, where substitutions have occurred

## 2021-05-16 NOTE — PROGRESS NOTES
Attending documentation- Barbra Wright B Dobbs 79 y o  female MRN: [id number]  Unit/Bed#: [facility] 338-58 Encounter: [id number]      patient was initially evaluated approximately 7:30 a m  Overnight she was reported to have significant change in her clinical status requiring initiation of BiPAP for respiratory support as well as Levophed, vasopressin and Milrinone  Patient's metabolic acidosis significantly worsened  Cardiology was contacted regarding cardiac catheterization to assess for etiology of refractory shock and worsening acidosis  Prior to going for cardiac catheterization cardiology requested rule out possibility of PULMONARY EMBOLISM  Patient underwent CTA which did not identify pulmonary embolism  Plan was for patient to proceed to cardiac catheterization this morning  Prior to cardiac catheterization patient was  Noted to have increased work of breathing  When she was evaluated she was asked if she felt like she was breathing appropriately and if she felt tired, she stated she did feel tired and was having difficulty with breathing despite having BiPAP  Endotracheal intubation was discussed with patient for which she agreed  Prior to intubation patient was asked to move all extremities for which she wiggled her toes and was able to wiggle her fingers and elevate her arms off the bed  She was having difficulty with plantar dorsiflexion and bending in her knees stating that she felt increased weakness  This was reported to be had noted last night as well  Patient did not have any back pain with palpation and did have   Palpable pulses bilateral   Sensory was fully intact  Patient was successfully intubated with 7 5 endotracheal tube  Please refer to procedure note  She received bicarbonate a total of 200 mEq along with her bicarbonate infusion as she became more acidotic and had lower mean arterial pressure prior to intubation and throughout RSI    Patient did respond appropriately to bicarbonate with improvement in her mean arterial pressure  Vasopressors remained at Levophed 25 mcg and vasopressin 0 04 units  Milrinone was maintained at prior infusion dose  Bedside point of care ultrasound was performed overnight which did not show any significant change in cardiac function  Patient did appear to have a transient improvement in RV function which was subsequently noted to be declined again overnight  SvO2 prior to initiation of Milrinone was 34  As patient was in cardiac catheterization lab salicylate level, acetaminophen level and serum osmolarity were obtained to evaluate for possible toxicity causing worsening clinical shock  Measured serum osmolarity 338 with calculated initial serum osmolarity of approximately 307  Is unclear the meaning of this osmolar gap with patient's resuscitation of 52 minutes and multiple boluses of different resuscitation medications  Salicylate and acetaminophen level not in toxic range  Cardiac catheterization showed all vessels to be patent no acute occlusion identified  Access site was right groin, groin does not have hematoma  Continue with hemodynamic support  Patient was administered additional boluses of bicarbonate secondary to BMP with calculated bicarb of 13  She was also administered dose of methylene blue and started on stress dose steroids  She is still being treated for UTI with Zosyn  Hemodynamic support with vasopressors has not been weaned  Goal is to maintain mean arterial pressure greater than 65  In addition when patient was noted to be more acidotic she return to complete heart block and variable heart block for which she required pacing  She also required an increase in her milliamps when she was more acidotic  Patient's daughter was contacted by Cardiology as well as by critical care medicine to inform her of events  She was contacted overnight as well to inform of clinical decline    Patient's daughter was asked if patient has a history alcohol consumption or which she concern with possibility of medication overdose  Patient's daughter states that her mother has been confused she cannot exclude the fact that she may have taken additional medications but states she has no history of substance abuse or alcohol abuse and never drinks alcohol on a regular basis  She did state that since her brother passed away and an accident [place] [address] [facility] for the patient has had chronic depression  She does not report any acute events that would have made her depression worse  She does state over the past few months the patient has had increasing difficulty with mobilization and seems to not be engaging in activities as much  She also reports her mother complained of some abdominal pain last week for which she almost went to the emergency department for  Her mother states that her abdominal pain improved and she did  Not seek medical attention  Considering patient's worsening clinical status and possibility of ingesting medications unintentionally plan to check amitriptyline level,  Iron level and for completeness digoxin level with complete heart block and variable block  Patient did have a calculated osmolar gap on initial laboratory data  It is unclear the significance of this gap with patient be resuscitative 52 minutes  For completeness will check toxic alcohol as well as administer a dose of  Fomepizole  untilresults have returned  Continue with supportive care  With ventilatory support, hemodynamic support and renal replacement therapy  Continue to dose methylene blue q 6 hours x4 total doses  CVVH to be run even  Maintain on antibiotics  Consider toxicology consultation if toxic alcohols or amitriptyline level consistent with Toxic level   When patient is clinically stable reassess neurologic exam     Cardiac arrest-unclear etiology differential includes primary cardiac versus metabolic etiology resulting in cardiac arrest  Shock-undifferentiated cardiogenic versus obstructive versus septic  Cardiac arrhythmia with variable block  Acute hypoxic respiratory failure  Acute encephalopathy-multifactorial metabolic cannot exclude CNS etiology  Metabolic acidosis/lactic acidosis  Acute kidney injury  Hypokalemia  Hypomagnesemia  Urinary tract infection  Hyperglycemia with diabetes mellitus type 2-glycemic control with insulin protocol  Seizure disorder -continue Keppra  Coronary artery disease with history of CABG 14 years ago  Hypertension history  GERD history    Critical care does not include family update or procedures    Critical care time 86 minutes

## 2021-05-16 NOTE — PROGRESS NOTES
Critical Care Interval Progress Note:   Eleazar Fisher 79 y o  female MRN: [id number]    Unit/Bed#: [facility] 667-43 Encounter: [id number]    Summary of Critical Care:    Called to bedside for acute hypotension  No obvious precipitating event  Patient is noted to be cool extremities, mottled and mildly confused - UOP 20 ml/hr all consistent with low flow state  SVO2 was 34 prior to this event - suspect cardiogenic shock  POCUS with Dr Ave Duverney completed - stable from previous echo's  ECG unchanged from previous, ST elevations in V2 V3 and lead 3 unchanged from ECGs during the day  250 albumin given and levo, vaso added  HR noted to drop from 90's to 60's bpm during hypotension, no rhythm change - remained NSR with 1st degree AVB  VPacer programmed to 90 bpm with improvement in her BP  Unfortunately, she soon after was noted to have converted to afib at 100's bpm and pacer was backed down to 70 to avoid competing with intrinsic  LA 11  Milrinone started at 0 25 mcg and flotrack and CVP monitoring ordered  No UOP in the hour after albumin given so 500 ml IVF administered also  Pt reports she sometimes wears CPAP at home, BiPap ordered for qHS  Continue resuscitation and trend endpoints q 2 hours  Will also make cardiology aware  Cont heparin infusion for presumed PE though unable to complete CT with contrast given worsening MUSTAPHA therefore PE never confirmed  Consider possible cardiac component vs PE given history of CAD and CABG in 2007, ST elevations, and cardiogenic shock  Counseling / Coordination of Care: Total Critical Care time spent 48 minutes excluding procedures, teaching and family updates

## 2021-05-16 NOTE — UTILIZATION REVIEW
Initial Clinical Review    Admission: Date/Time/Statement:   Admission Orders (From admission, onward)     Ordered        05/14/21 1501  Inpatient Admission  Once                   Orders Placed This Encounter   Procedures    Inpatient Admission     Standing Status:   Standing     Number of Occurrences:   1     Order Specific Question:   Level of Care     Answer:   Critical Care [15]     Order Specific Question:   Estimated length of stay     Answer:   More than 2 Midnights     Order Specific Question:   Certification     Answer:   I certify that inpatient services are medically necessary for this patient for a duration of greater than two midnights  See H&P and MD Progress Notes for additional information about the patient's course of treatment  ED Arrival Information     Patient not seen in ED -- tx'd from [address] [place] [address] s/p cardiac arrest                     Initial Presentation:  79 y o  female  PMHx CAD with inferior wall MI s/p CABG in 2007, HTN, GERD, spinal stenosis, tobacco use who presented to [facility] in cardiac arrest  Today, 5/14 patient's landlord checked on patient after family had not heard from her, found her sitting on the toilet only able to whisper and then was unresponsive, EMS was activated  Down time ~  6 min prior to EMS arrival when patient was found to be pulseless and CPR initiated, rhythm was asystole  ROSC was achieved in ED after ~ 52 minutes of CPR  ECG at this time was concerning for STEMI  Transferred to [facility] by flight for interventional cardiology  Arrives to the ICU on Epinephrine 15 and Levophed at 30 which has since been weaning down  Admit inpatient critical care --- CT head when stable  AC/VC 22/420/70/8  Hold sedation to assess neuro exam  Continue Keppra  Levophed 6, Epi 2 - wean for MAP goal >65  Bedside POCUS on levophed and epinephrine with preserved LV function, decreased RV function, septal flattening   Start VTE Heparin once head CT complete to rule out ICH  Bradycardia with high degree AV block, bundle branch block, prolonged QTc/  Place TVP bedside for pacing requirements  7 0 with HCO3 10 1 on iSTAT - give 2 amps bicarb  Received 3 L IVF at OSH  Check BMP, LA  Check LE duplex to r/o DVT  UA - + leuks, innumerable WBC and bacteria  Start Zosyn, avoid cefepime with hx seizure disorder        Date: 5/15   Day 2: She was found to have findings consistent with PE on US and was started on VTE heparin gtt  BLE Duplex negative for DVT  TTM was aborted and patient was warmed and kept at 37 degrees, transitioned off arctic sun  She had a temporary pacing wire placed for bradyarrythmias  Pacer turned to back up rate early in the evening and remains in NSR at this time  Her vasopressors have all been weaned off and her acidosis has corrected on her ABG  Her lactic acidosis continues to trend down but has not yet cleared  On-going cautious volume resuscitation with EF 30-35%  SBT and attempt to extubate today  Propofol and fentanyl for sedation while intubated  Change bicarb gtt to isolyte  continue IV Zosyn   Ucx and blood cx pending         ED Triage Vitals   Temperature Pulse Respirations Blood Pressure SpO2   05/14/21 1537 05/14/21 1440 05/14/21 1440 05/14/21 1455 05/14/21 1440   (!) 92 8 °F (33 8 °C) 72 (!) 29 109/82 91 %      Temp Source Heart Rate Source Patient Position - Orthostatic VS BP Location FiO2 (%)   05/14/21 1542 05/14/21 1455 05/14/21 1455 05/14/21 1455 05/16/21 1100   Bladder Monitor Lying Left arm 100      Pain Score       05/14/21 1758       Med Not Given for Pain - for MAR use only          Wt Readings from Last 1 Encounters:   05/15/21 60 6 kg (133 lb 9 6 oz)     Additional Vital Signs:   Date/Time  Temp  Pulse  Resp  BP  MAP (mmHg)  Arterial Line BP  MAP  SpO2  FiO2 (%)  Calculated FIO2 (%) - Nasal Cannula  Nasal Cannula O2 Flow Rate (L/min)  O2 Device  O2 Interface Device  CO  CI  Patient Position - Orthostatic VS  CVP  CVP (mean) 05/16/21 1800  97 5 °F (36 4 °C)   98   21   --  --  94/50   62 mmHg   96 %   --  --  --  Ventilator  --  --  2 2 L/min/m2  --  --  --   05/16/21 1700  97 2 °F (36 2 °C)Abnormal   100  21  --  --  98/52  64 mmHg  95 %  --  --  --  --  --  --  2 3 L/min/m2  --  --  --   05/16/21 1616  --  --  --  --  --  --  --  97 %  --  --  --  --  --  --  --  --  --  --   05/16/21 1600  96 8 °F (36 °C)Abnormal   100  22  --  --  98/54  66 mmHg  98 %  --  --  --  --  --  --  2 4 L/min/m2  --  --  --   05/16/21 1500  96 1 °F (35 6 °C)Abnormal   100  21  --  --  90/48  60 mmHg  96 %  --  --  --  Ventilator  --  --  2 3 L/min/m2  --  --  --   05/16/21 1400  95 4 °F (35 2 °C)Abnormal   100  21  --  --  86/46  58 mmHg  92 %  --  --  --  Ventilator  --  --  --  --  --  --   05/16/21 1358  95 4 °F (35 2 °C)Abnormal   100  21  --  --  88/48  60 mmHg  93 %  --  --  --  --  --  --  2 3 L/min/m2  --  --  --   05/16/21 1309  94 6 °F (34 8 °C)Abnormal   86  21  --  --  94/48  62 mmHg  92 %  --  --  --  --  --  --  --  --  --  --   05/16/21 1300  94 6 °F (34 8 °C)Abnormal   86  21  --  --  96/48  62 mmHg  93 %  --  --  --  Ventilator  --  --  2 1 L/min/m2  --  --  --   05/16/21 1230  94 6 °F (34 8 °C)Abnormal   98  21  --  --  108/56  70 mmHg  96 %  --  --  --  --  --  --  --  --  --  --   05/16/21 1200  94 3 °F (34 6 °C)Abnormal   98  21  --  --  102/54  68 mmHg  92 %  --  --  --  Ventilator  --  --  2 4 L/min/m2  --  --  --   05/16/21 1100  94 6 °F (34 8 °C)Abnormal   100  24Abnormal   --  --  84/50  62 mmHg  --  100  --  --  Ventilator  --  --  --  --  --  --   05/16/21 1000  95 4 °F (35 2 °C)Abnormal   98  14  --  --  112/60  76 mmHg  90 %  --  --  --  --  --  --  --  --  --  --   05/16/21 0900  --  --  --  --  --  --  --  94 %  --  --  --  --  --  --  --  --  --  --   05/16/21 0809  94 6 °F (34 8 °C)Abnormal   98  24Abnormal   --  --  --  --  --  --  --  --  --  --  --  --  --  --  --   05/16/21 0806  94 6 °F (34 8 °C)Abnormal   100 36Abnormal   --  --  --  --  --  --  --  --  --  --  --  --  --  --  --   05/16/21 0803  93 9 °F (34 4 °C)Abnormal   98  9Abnormal   --  --  --  --  --  --  --  --  --  --  --  --  --  --  --   05/16/21 0800  90 3 °F (32 4 °C)Abnormal   98  23Abnormal   --  --  --  --  --  --  --  --  --  --  --  --  --  --  --   05/16/21 0757  96 1 °F (35 6 °C)Abnormal   92  25Abnormal   --  --  --  --  --  --  --  --  --  --  --  --  --  --  --   05/16/21 0754  96 1 °F (35 6 °C)Abnormal   98  24Abnormal   --  --  --  --  --  --  --  --  --  --  --  --  --  --  --   05/16/21 0751  95 4 °F (35 2 °C)Abnormal   90  28Abnormal   --  --  --  --  --  --  --  --  --  --  --  --  --  --  --   05/16/21 0748  95 7 °F (35 4 °C)Abnormal   98  24Abnormal   --  --  --  --  --  --  --  --  --  --  --  --  --  --  --         1715  93 2 °F (34 °C)Abnormal   --  --  --  --  --  --  --  --  --  --  --  --  --  --  --  --  --   05/14/21 1700  92 8 °F (33 8 °C)Abnormal   78  16  93/69  84  120/56  76 mmHg  100 %  --  --  --  --  --  --  --  --  --  --   05/14/21 1625  92 8 °F (33 8 °C)Abnormal   66  21  85/46Abnormal   58  76/44  60 mmHg  90 %  --  --  --  Ventilator  --  --  --  Lying  --  --   05/14/21 1617  --  --  --  --  --  --  --  99 %  --  --  --  --  --  --  --  --  --  --   05/14/21 1600  92 8 °F (33 8 °C)Abnormal   54Abnormal   20  92/60  70  102/58  72 mmHg  71 %Abnormal   --  --  --  --  --  --  --  --  --  --   05/14/21 1556  92 8 °F (33 8 °C)Abnormal   60  14  87/71Abnormal   76  106/58  74 mmHg  76 %Abnormal   --  --  --  --  --  --  --  --  --  --   05/14/21 1542  92 8 °F (33 8 °C)Abnormal   64  16  102/55  76  110/62  78 mmHg  78 %Abnormal   --  --  --  --  --  --  --  Lying  --  --   05/14/21 1537  92 8 °F (33 8 °C)Abnormal   68  26Abnormal   85/46Abnormal   62  86/46  60 mmHg  75 %Abnormal   --  --  --  --  --  --  --  --  --  --   05/14/21 1500  --  96  21  95/80  85  172/78  106 mmHg  93 %  --  --  --  --  --  --  --  --  --  -- 05/14/21 1455  --  86  22  109/82  100  204/104  132 mmHg  92 %  --  --  --  Ventilator  --  --  --  Lying  --  --   05/14/21 1445  --  88  19  --  --  144/82  100 mmHg  94 %  --  --  --  --  --  --  --  --  --  --   05/14/21 1440  --  72  29Abnormal   --  --  120/74  90 mmHg  91 %  --  --  --  --  --  --  --  --  --  --       Pertinent Labs/Diagnostic Test Results:   CXR 5/14 -- Interval placement of typical right IJ CVP line with tip projecting at superior right atrium, without complication  No acute cardiopulmonary disease  CXR 5/15 -- No acute cardiopulmonary disease  CT c/a/p 5/14 --   1   There is mild bowel wall thickening of the colon in a nonspecific manner which may reflect underlying colitis   Some suspected mild diffuse small bowel wall thickening noted as well   Given the patient's recent cardiac arrest, the possibility of an   ischemic colitis is raised  2   Stranding of the peripancreatic fat raising the possibility of underlying pancreatitis  3   Small amount of abdominal ascites with fluid in the sharlene hepatis and anterior pararenal space and subhepatic space of uncertain etiology, but possibly due to pancreatitis   No free air to suggest perforated viscus although this cannot be entirely   excluded  4   Nonspecific bilateral perinephric stranding   No obstructive uropathy  5   Slightly distended but otherwise unremarkable appearing gallbladder  6   Constipation  7   Cardiomegaly with small pericardial effusion   Transvenous pacing lead present  8   Additional findings as noted     CT head 5/14 --   1   Microangiopathic and remote postischemic change without acute intracranial abnormality noted   Note that changes of hypoxic/ischemic encephalopathy may not manifest on CT imaging in the hyperacute phase, and appropriate clinical correlation and   follow-up imaging suggested    Results from last 7 days   Lab Units 05/14/21  1334   SARS-COV-2  Negative     Results from last 7 days Lab Units 05/16/21  1015 05/16/21  0439 05/15/21  1008 05/15/21  0436 05/14/21  2123 05/14/21  1844  05/14/21  1309   WBC Thousand/uL  --  15 11*  --  16 90* 18 96* 23 87*   < > 17 57*   HEMOGLOBIN g/dL 8 7* 8 5*  --  10 1* 10 4* 11 3*   < > 10 3*   I STAT HEMOGLOBIN g/dl  --   --  9 9*  --   --   --    < >  --    HEMATOCRIT %  --  25 7*  --  30 6* 32 7* 36 1   < > 31 9*   HEMATOCRIT, ISTAT %  --   --  29*  --   --   --    < >  --    PLATELETS Thousands/uL  --  169  --  187 187 226   < > 187   NEUTROS ABS Thousands/µL  --  12 92*  --  14 00* 16 43* 20 41*   < >  --    BANDS PCT %  --   --   --   --   --   --   --  2    < > = values in this interval not displayed  Results from last 7 days   Lab Units 05/16/21  1537 05/16/21  1350 05/16/21  1015 05/16/21  4330 05/16/21  0545 05/16/21  0442 05/16/21  0426 05/16/21  0208  05/15/21  1016  05/15/21  0436   SODIUM mmol/L 144 145 145  --  144  --   --  146*   < >  --    < > 147*   POTASSIUM mmol/L 4 5 4 2 3 8  --  3 8  --   --  3 8   < >  --    < > 4 0   CHLORIDE mmol/L 107 106 107  --  109*  --   --  110*   < >  --    < > 114*   CO2 mmol/L 19* 19* 16*  --  13*  --   --  13*   < >  --    < > 19*   CO2, I-STAT   --   --   --   --   --   --   --   --   --   --    < >  --    ANION GAP mmol/L 18* 20* 22*  --  22*  --   --  23*   < >  --    < > 14*   BUN mg/dL 29* 32* 35*  --  37*  --   --  36*   < >  --    < > 32*   CREATININE mg/dL 1 96* 1 97* 2 28*  --  2 40*  --   --  2 30*   < >  --    < > 1 81*   EGFR ml/min/1 73sq m 25 25 21  --  20  --   --  21   < >  --    < > 28   CALCIUM mg/dL 9 1 9 3 8 9  --  6 8*  --   --  7 4*   < >  --    < > 8 8   CALCIUM, IONIZED mmol/L 1 10*  --  1 09* 1 05*  --   --  0 98*  --   --  1 13  --  1 18   MAGNESIUM mg/dL 2 3  --  2 5  --  2 6 2 7*  --   --   --   --   --  2 8*   PHOSPHORUS mg/dL 3 0  --  4 7*  --  4 6* 5 0*  --   --   --   --   --  2 9    < > = values in this interval not displayed       Results from last 7 days   Lab Units 05/16/21  1625 05/16/21  1134 05/16/21  1015 05/16/21  0456 05/15/21  1202   AST U/L 6,881* 5,545* 5,378* 3,559* 299*   ALT U/L 2,496* 1,989* 1,955* 1,465* 105*   ALK PHOS U/L 94 82 85 73 91   TOTAL PROTEIN g/dL 5 5* 5 3* 5 5* 5 3* 5 6*   ALBUMIN g/dL 2 5* 2 4* 2 5* 2 5* 2 3*   TOTAL BILIRUBIN mg/dL 0 59 0 52 0 58 0 45 0 21   BILIRUBIN DIRECT mg/dL 0 43* 0 35* 0 35* 0 29* 0 17     Results from last 7 days   Lab Units 05/16/21  1714 05/16/21  1131 05/16/21  0957 05/15/21  2052 05/15/21  1718 05/15/21  1121 05/15/21  1006 05/15/21  0556 05/15/21  0004 05/14/21  1843   POC GLUCOSE mg/dl 166* 204* 171* 210* 157* 147* 134 146* 162* 155*     Results from last 7 days   Lab Units 05/16/21  1537 05/16/21  1350 05/16/21  1015 05/16/21  0545 05/16/21  0208 05/15/21  2338 05/15/21  1805 05/15/21  1338 05/15/21  1010 05/15/21  0436 05/15/21  0046 05/14/21  1844   GLUCOSE RANDOM mg/dL 175* 169* 188* 209* 202* 219* 209* 172* 140 155* 166* 180*     Results from last 7 days   Lab Units 05/14/21  1844   OSMOLALITY, SERUM mmol/*     Results from last 7 days   Lab Units 05/14/21  1844   HEMOGLOBIN A1C % 6 3*   EAG mg/dl 134     No results found for: BETA-HYDROXYBUTYRATE   Results from last 7 days   Lab Units 05/16/21  1537 05/16/21  1350 05/16/21  1014   PH ART  7 419 7 399 7 345*   PCO2 ART mm Hg 25 5* 28 9* 20 5*   PO2 ART mm Hg 84 0 69 1* 102 4   HCO3 ART mmol/L 16 1* 17 5* 10 9*   BASE EXC ART mmol/L -7 5 -6 5 -13 2   O2 CONTENT ART mL/dL 9 9* 10 2* 11 3*   O2 HGB, ARTERIAL % 93 5* 89 5* 95 2   ABG SOURCE  Line, Arterial Line, Arterial Line, Arterial     Results from last 7 days   Lab Units 05/16/21  1537 05/16/21  1132 05/16/21  0640   PH SUNG  7 338 7 272* 7 264*   PCO2 SUNG mm Hg 37 9* 35 9* 30 3*   PO2 SUNG mm Hg 30 8* 35 2 31 0*   HCO3 SUNG mmol/L 19 9* 16 2* 13 4*   BASE EXC SUNG mmol/L -5 4 -9 8 -12 4   O2 CONTENT SUNG ml/dL 5 7 6 9 5 3   O2 HGB, VENOUS % 43 1* 52 2* 40 3*     Results from last 7 days   Lab Units 05/15/21  1008 05/14/21  1505   I STAT BASE EXC mmol/L -7* -9*   I STAT O2 SAT % 96* 100*   ISTAT PH ART  7 405 7 259*   I STAT ART PCO2 mm HG 27 0* 38 7   I STAT ART PO2 mm HG 78 0 316 0*   I STAT ART HCO3 mmol/L 16 9* 17 3*         Results from last 7 days   Lab Units 05/16/21  0409 05/15/21  1656 05/15/21  1338 05/15/21  1010 05/15/21  0909 05/14/21  2111 05/14/21  1844   TROPONIN I ng/mL >40 00* >40 00* >40 00* >40 00* >40 00* 15 50* 8 06*     Results from last 7 days   Lab Units 05/14/21  1309   D-DIMER QUANTITATIVE ug/ml FEU >20 00*     Results from last 7 days   Lab Units 05/16/21  0545 05/15/21  1956 05/15/21  1202  05/14/21  2123 05/14/21  1844 05/14/21  1527   PROTIME seconds  --   --   --   --  16 6* 16 6* 16 7*   INR   --   --   --   --  1 35* 1 35* 1 36*   PTT seconds 87* >210* 191*   < > 27 30 32    < > = values in this interval not displayed       Results from last 7 days   Lab Units 05/15/21  1338   TSH 3RD GENERATON uIU/mL 0 807     Results from last 7 days   Lab Units 05/15/21  1202 05/15/21  0436 05/14/21  2111   PROCALCITONIN ng/ml 7 00* 6 76* 4 41*     Results from last 7 days   Lab Units 05/16/21  1537 05/16/21  1339 05/16/21  1134 05/16/21  1015 05/16/21  0545 05/16/21  0409 05/16/21  0208   LACTIC ACID mmol/L 13 1* 12 2* 13 6* 14 2* 12 6* 12 7* 11 7*         Results from last 7 days   Lab Units 05/16/21  1134   DIGOXIN LVL ng/mL <0 2*     Results from last 7 days   Lab Units 05/14/21  1309   NT-PRO BNP pg/mL 7,841*     Results from last 7 days   Lab Units 05/14/21  2111   LIPASE u/L 283         Results from last 7 days   Lab Units 05/15/21  1112 05/14/21  1329   CLARITY UA  Turbid Cloudy   COLOR UA  Dk Yellow Yellow   SPEC GRAV UA  1 021 1 025   PH UA  5 0 6 0   GLUCOSE UA mg/dl Negative Negative   KETONES UA mg/dl Negative Negative   BLOOD UA  Moderate* Moderate*   PROTEIN UA mg/dl 30 (1+)* 100 (2+)*   NITRITE UA  Negative Negative   BILIRUBIN UA  Interference- unable to analyze* Negative UROBILINOGEN UA E U /dl 0 2 0 2   LEUKOCYTES UA  Large* Large*   WBC UA /hpf Innumerable* Innumerable*   RBC UA /hpf 4-10* 2-4   BACTERIA UA /hpf Moderate* Innumerable*   EPITHELIAL CELLS WET PREP /hpf Moderate* Occasional           Results from last 7 days   Lab Units 05/15/21  1338 05/14/21  1844   ACETAMINOPHEN LVL ug/mL  --  3*   SALICYLATE LVL mg/dL <3*  --      Results from last 7 days   Lab Units 05/14/21  1921 05/14/21  1731 05/14/21  1329 05/14/21  1309   BLOOD CULTURE  No Growth at 24 hrs  No Growth at 24 hrs   --  No Growth at 24 hrs     URINE CULTURE   --   --  >100,000 cfu/ml Escherichia coli*  --      Results from last 7 days   Lab Units 05/14/21  1309   TOTAL COUNTED  100       Past Medical History:   Diagnosis Date    CAD (coronary artery disease), native coronary artery 2007    stent and CABG x 3     Cervical spinal stenosis     Gastric ulcer     GERD (gastroesophageal reflux disease)     HTN (hypertension)      Present on Admission:   Positive D dimer   Elevated troponin   Encephalopathy acute   [facility] arrest [facility] )   MUSTAPHA (acute kidney injury) ([facility] )   Metabolic acidosis   Lactic acidosis   Epilepsy ([facility] )   Depression   Shock ([facility] )   CAD (coronary artery disease)   Hypertension   Arrhythmia   [facility]   Anemia   UTI (urinary tract infection)   Type 2 diabetes mellitus ([facility])      Admitting Diagnosis: STEMI (ST elevation myocardial infarction) ([place] [address] ) [I21 3]  Cardiac arrest ([place] [address] ) [I46 9]  Age/Sex: 79 y o  female  Admission Orders:  Scheduled Medications:  aspirin, 81 mg, Oral, Daily  chlorhexidine, 15 mL, Mouth/Throat, Q12H JOESPH  fentanyl citrate (PF), , ,   hydrocortisone sodium succinate, 50 mg, Intravenous, Q6H [facility]  insulin lispro, 1-5 Units, Subcutaneous, Q6H  levETIRAcetam, 500 mg, Intravenous, Q12H JOESPH  lidocaine, 1 patch, Topical, Daily  methylene blue IVPB, 100 mg, Intravenous, Q6H  omeprazole (PRILOSEC) suspension 2 mg/mL, 20 mg, Oral, Daily  piperacillin-tazobactam, 3 375 g, Intravenous, Q8H  senna-docusate sodium, 1 tablet, Oral, BID  sodium bicarbonate, 100 mEq, Intravenous, Once  [START ON 5/17/2021] vancomycin, 20 mg/kg, Intravenous, Q24H  vancomycin, 25 mg/kg, Intravenous, Once      Continuous IV Infusions:  fentaNYL, 75 mcg/hr, Intravenous, Continuous  heparin (porcine), 3-30 Units/kg/hr (Order-Specific), Intravenous, Titrated  milrinone (PRIMACOR) infusion, 0 25 mcg/kg/min, Intravenous, Continuous  norepinephrine, 1-30 mcg/min, Intravenous, Titrated  NxStage K 4/Ca 3, 20,000 mL, Dialysis, Continuous  sodium bicarbonate infusion, 150 mL/hr, Intravenous, Continuous  vasopressin, 0 04 Units/min, Intravenous, Continuous      PRN Meds:  fentanyl citrate (PF), 50 mcg, Intravenous, Q2H PRN  heparin (porcine), 2,200 Units, Intravenous, Q1H PRN  heparin (porcine), 4,400 Units, Intravenous, Q1H PRN  Labetalol HCl, 10 mg, Intravenous, Q6H PRN  oxyCODONE, 2 5 mg, Oral, Q4H PRN    Or  oxyCODONE, 5 mg, Oral, Q4H PRN          Network Utilization Review Department  ATTENTION: Please call with any questions or concerns to [phone] and carefully listen to the prompts so that you are directed to the right person  All voicemails are confidential   Noé Leavitt all requests for admission clinical reviews, approved or denied determinations and any other requests to dedicated fax number below belonging to the campus where the patient is receiving treatment   List of dedicated fax numbers for the Facilities:  [address] DENIALS (Administrative/Medical Necessity) [phone]   [address] [place] [address] (Maternity/NICU/Pediatrics) 261 [facility] [place] [phone] [id number] [facility] [place] [address] [phone]

## 2021-05-16 NOTE — PROGRESS NOTES
Daily Progress Note - Critical Care   Marita Lugo 79 y o  female MRN: [id number]  Unit/Bed#: [facility] 828-61 Encounter: [id number]    ----------------------------------------------------------------------------------------  HPI/24hr events:  79-year-old female past medical history CAD with inferior wall MI s/p CABG 2007, HTN, GERD, tobacco use who presented 5/14 to [address] [place] [address] in cardiac arrest   Underwent 52 minutes of CPR with ROSC and transferred to [facility]  Encephalopathy improved yesterday and was extubated in the morning  Episodes of acute severe hypotension yesterday during the day with significant Levophed requirements and worsening acidosis  Levophed came to off with addition of vasopressin which eventually was weaned to off as well  Overnight another acute episode of hypotension now associated with bradycardia, confusion, cool extremities and mottling  SvO2 34%, POCUS and ECG stable from a m  [facility] She was started on Milrinone at 0 25 along with Levophed and vasopressin  TVP rate increased to 100  Patient was started on CRRT for purposes stent metabolic acidosis and acute renal failure leading to worsening his shock state  CTA PE study completed negative for pulmonary embolism  Respiratory status continued to worsen attempting to compensate for metabolic acidosis, was placed on BiPAP and was ultimately intubated this morning  Underwent cardiac catheterization with patent prior CABG grafts  Severe hypotension persists at this time  ---------------------------------------------------------------------------------------  SUBJECTIVE  Reports breathing feels "labored" and toes are painful and cold  Denies active chest pain or abdominal pain, but reports chest pain/pressure overnight  Review of Systems   Constitutional: Positive for fatigue  HENT: Negative  Respiratory: Positive for shortness of breath  Negative for cough  Cardiovascular: Positive for chest pain  Negative for palpitations and leg swelling  Gastrointestinal: Negative for abdominal distention, abdominal pain, nausea and vomiting  Neurological: Positive for weakness (BLLE) and headaches  Negative for dizziness, seizures, speech difficulty and light-headedness       Review of systems was reviewed and negative unless stated above in HPI/24-hour events   ---------------------------------------------------------------------------------------  Assessment and Plan:    Neuro:    Diagnosis: Acute toxic encephalopathy   o Plan: Secondary to critical illness, shock   o Sedation: Fentanyl 75 mcg/hr   - Avoiding propofol in setting of cardiogenic shock  o CAM ICU daily, delirium precautions     Diagnosis: BLLE weakness  o Plan: Profound weakness of BLLE, only able to wiggle toes, sensation intact throughout    o Toes cool to palpation but good doppler pulses bilaterally   o CT without spinal injury noted, not complaining of back pain   o Continue to monitor neuro exam       CV:    Diagnosis: Shock - mixed, RV dysfunction, transient AV block   o Plan: Levophed 30, Vasopressin 0 04 with persistent hypotension   - Give 100 mg methylene blue now   - Avoiding epinephrine with acidosis   o Cardiogenic shock and RV dysfunction-PE/obstructive process ruled out with negative PE study overnight  - Milrinone 0 25 with SvO2 34 increased to 40 %  - Recheck VBG  o Concern for possible polypharmacy/ toxic ingestion - will attempt to add on Tylenol, aspirin and serum osmolality to admission labs  o Continue TVP - pacing at 100 bpm   Diagnosis: CAD, non-MI troponin elevation   o Plan:  5/16 cardiac catheterization- of LAD, RCA, circ, SVG to Om1-Om2, LIMA to LAD patent   o Aspirin daily  o Hold statin setting of severe transaminitis      Pulm:   Diagnosis: Acute hypoxic respiratory failure   o Plan: Secondary to metabolic acidosis   o CTA negative for PE   o Reintubated this morning   o AC/VC 24/420/100/8  - Wean fio2 for spo2 >92%   o VAP bundle ordered       GI:  Diagnosis: Transaminits    o Plan: Secondary to congestive hepatopathy with shock liver   o Continue to trend LFTs  o Avoid hepatotoxic medications    Diagnosis: GERD   o Plan: Continue home PPI       :    Diagnosis: Acute renal failure with HAGMA, lactic acidosis   o Plan: Likely 2/2 cardiorenal syndrome and low flow state in setting of RV failure and shock   o CRRT initiated, currently running even   o Continue bicarbonate infusion @ 100 until bicarbonate improves on BMP with CRRT started   o q6h labs   o Kohli in place, severely oliguirc    F/E/N:    F: Bicarb @ 100    E: Repletion per CRRT protocol    N: NPO       Heme/Onc:    Diagnosis: Anemia   o Plan:  Likely 2/2 critical illness, frequent phlebotomy   o No signs of active bleeding on heparin infusion  o Continue to monitor hemoglobin   Diagnosis: DVT  o Plan: LE duplex - BL non-occlusive subacute/chronic thrombosis at CFV and saphenofemoral junction  o Continue VTE high heparin       Endo:    Diagnosis: T2DM   o Plan: Accucheck q6h with ISS   o BG goal 140-180     ID:    Diagnosis: GNR UTI (POA)  o Plan: Zosyn day 3  o >100,000 CFU GNR on urine culture - f/u culture to deescalate abx   o Blood cultures negative x 24 hrs   o Afebrile     MSK/Skin:   o Frequent turning and repositioning       Disposition: Continue Critical Care   Code Status: Level 2 - DNAR: but accepts endotracheal intubation  ---------------------------------------------------------------------------------------  ICU CORE MEASURES    Prophylaxis   VTE Pharmacologic Prophylaxis: Heparin Drip  VTE Mechanical Prophylaxis: sequential compression device  Stress Ulcer Prophylaxis: Omeprazole PO    Invasive Devices Review  Invasive Devices     Central Venous Catheter Line            CVC Central Lines 05/14/21 Triple Right Subclavian 1 day          Peripheral Intravenous Line            Peripheral IV 05/15/21 Left;Proximal;Ventral (anterior) Forearm less than 1 day          Arterial Line            Arterial Line 21 Left Radial 1 day          Hemodialysis Catheter            HD Temporary Double Catheter less than 1 day          Drain            Urethral Catheter Temperature probe 16 Fr  1 day    NG/OG/Enteral Tube Orogastric Center mouth less than 1 day          Airway            ETT  Cuffed; Hi-Lo 7 5 mm less than 1 day              Can any invasive devices be discontinued today? No  ---------------------------------------------------------------------------------------  OBJECTIVE    Vitals   Vitals:    21 0809 21 0900 21 1000 21 1100   BP:       BP Location:       Pulse: 98  98 100   Resp: (!) 24  14 (!) 24   Temp: (!) 94 6 °F (34 8 °C)  (!) 95 4 °F (35 2 °C) (!) 94 6 °F (34 8 °C)   TempSrc:   Bladder Bladder   SpO2:  94% 90%    Weight:         Temp (24hrs), Av 1 °F (36 2 °C), Min:75 9 °F (24 4 °C), Max:99 3 °F (37 4 °C)  Current: Temperature: (!) 94 6 °F (34 8 °C)    Respiratory:  Invasive/non-invasive ventilation settings   Respiratory    Lab Data (Last 4 hours)       0923  1014          pH, Arterial       7 315          7 345          pCO2, Arterial       25 4          20 5          pO2, Arterial       81 8          102 4          HCO3, Arterial       12 6          10 9          Base Excess, Arterial       -12 2          -13 2               O2/Vent Data        09 09   Most Recent      Patient safety screen outcome: Failed Failed  Failed                 Physical Exam  Vitals signs reviewed  Constitutional:       General: She is awake  She is in acute distress  Appearance: She is normal weight  She is ill-appearing and toxic-appearing  Interventions: Face mask in place  HENT:      Head: Normocephalic and atraumatic  Eyes:      Pupils: Pupils are equal, round, and reactive to light     Neck:      Comments: R IJ TLC, introducer and TVP in place  L IJ HD cath in place  Cardiovascular:      Rate and Rhythm: Normal rate and regular rhythm  Heart sounds: Normal heart sounds  Comments: Toes cool to palpation  Pulmonary:      Effort: Tachypnea present  Breath sounds: Normal breath sounds  Comments: Tachypneic with labored breathing on BiPAP  Abdominal:      General: Abdomen is flat  There is no distension  Palpations: Abdomen is soft  Tenderness: There is no abdominal tenderness  Musculoskeletal:      Right lower leg: No edema  Left lower leg: No edema  Skin:     General: Skin is dry  Coloration: Skin is mottled  Comments: BL toes cool to palpation, mottled feet    Neurological:      Mental Status: She is lethargic        Comments: Oriented x 2 - place and person  UE strength 5/5 BL   LE - wiggles toes, sensation intact throughout, unable to lift legs, plantar flex feet bilaterally          Laboratory and Diagnostics:  Results from last 7 days   Lab Units 05/16/21  1015 05/16/21  0439 05/15/21  1008 05/15/21  0436 05/14/21  2123 05/14/21  1844 05/14/21  1527 05/14/21  1505 05/14/21  1309   WBC Thousand/uL  --  15 11*  --  16 90* 18 96* 23 87* 16 13*  --  17 57*   HEMOGLOBIN g/dL 8 7* 8 5*  --  10 1* 10 4* 11 3* 11 3*  --  10 3*   I STAT HEMOGLOBIN g/dl  --   --  9 9*  --   --   --   --  11 2*  --    HEMATOCRIT %  --  25 7*  --  30 6* 32 7* 36 1 35 8  --  31 9*   HEMATOCRIT, ISTAT %  --   --  29*  --   --   --   --  33*  --    PLATELETS Thousands/uL  --  169  --  187 187 226 204  --  187   NEUTROS PCT %  --  85*  --  82* 87* 85*  --   --   --    BANDS PCT %  --   --   --   --   --   --   --   --  2   MONOS PCT %  --  9  --  8 8 9  --   --   --    MONO PCT %  --   --   --   --   --   --   --   --  1*     Results from last 7 days   Lab Units 05/16/21  1015 05/16/21  0545 05/16/21  0456 05/16/21  0208 05/15/21  2338 05/15/21  1805 05/15/21  1338 05/15/21  1202 05/15/21  1010  05/14/21  1309   SODIUM mmol/L 145 144  --  146* 143 144 147*  --  147*   < > 150*   POTASSIUM mmol/L 3 8 3 8  --  3 8 3 8 4 5 4 5  --  4 2   < > 3 6   CHLORIDE mmol/L 107 109*  --  110* 107 110* 113*  --  114*   < > 116*   CO2 mmol/L 16* 13*  --  13* 13* 13* 13*  --  16*   < > 17*   CO2, I-STAT   --   --   --   --   --   --   --   --   --    < >  --    ANION GAP mmol/L 22* 22*  --  23* 23* 21* 21*  --  17*   < > 17*   BUN mg/dL 35* 37*  --  36* 38* 37* 33*  --  33*   < > 27*   CREATININE mg/dL 2 28* 2 40*  --  2 30* 2 39* 2 30* 2 16*  --  1 92*   < > 1 72*   CALCIUM mg/dL 8 9 6 8*  --  7 4* 8 0* 8 0* 8 3  --  8 7   < > 7 2*   GLUCOSE RANDOM mg/dL 188* 209*  --  202* 219* 209* 172*  --  140   < > 229*   ALT U/L 1,955*  --  1,465*  --   --   --   --  105*  --   --  125*   AST U/L 5,378*  --  3,559*  --   --   --   --  299*  --   --  253*   ALK PHOS U/L 85  --  73  --   --   --   --  91  --   --  98   ALBUMIN g/dL 2 5*  --  2 5*  --   --   --   --  2 3*  --   --  1 8*   TOTAL BILIRUBIN mg/dL 0 58  --  0 45  --   --   --   --  0 21  --   --  0 33    < > = values in this interval not displayed       Results from last 7 days   Lab Units 05/16/21  1015 05/16/21  0545 05/16/21  0442 05/15/21  0436 05/15/21  0046 05/14/21  1844 05/14/21  1527   MAGNESIUM mg/dL 2 5 2 6 2 7* 2 8* 3 0* 3 7* 2 1   PHOSPHORUS mg/dL 4 7* 4 6* 5 0* 2 9 2 6 4 6* 4 6*      Results from last 7 days   Lab Units 05/16/21  0545 05/15/21  1956 05/15/21  1202 05/15/21  0339 05/14/21  2123 05/14/21  1844 05/14/21  1527 05/14/21  1309   INR   --   --   --   --  1 35* 1 35* 1 36* 1 49*   PTT seconds 87* >210* 191* >210* 27 30 32 35      Results from last 7 days   Lab Units 05/16/21  0409 05/15/21  1656 05/15/21  1338 05/15/21  1010 05/15/21  0909 05/14/21  2111 05/14/21  1844   TROPONIN I ng/mL >40 00* >40 00* >40 00* >40 00* >40 00* 15 50* 8 06*     Results from last 7 days   Lab Units 05/16/21  1015 05/16/21  0545 05/16/21  0409 05/16/21  0208 05/15/21  2338 05/15/21  1956 05/15/21  1656   LACTIC ACID mmol/L 14 2* 12 6* 12 7* 11 7* 12 2* 11 8* 8 8*     ABG:  Results from last 7 days   Lab Units 05/16/21  1014   PH ART  7 345*   PCO2 ART mm Hg 20 5*   PO2 ART mm Hg 102 4   HCO3 ART mmol/L 10 9*   BASE EXC ART mmol/L -13 2   ABG SOURCE  Line, Arterial     VBG:  Results from last 7 days   Lab Units 05/16/21  1132 05/16/21  1014   PH SUNG  7 272*  --    PCO2 SUNG mm Hg 35 9*  --    PO2 SUNG mm Hg 35 2  --    HCO3 SUNG mmol/L 16 2*  --    BASE EXC SUNG mmol/L -9 8  --    ABG SOURCE   --  Line, Arterial     Results from last 7 days   Lab Units 05/15/21  1202 05/15/21  0436 05/14/21  2111   PROCALCITONIN ng/ml 7 00* 6 76* 4 41*       Micro  Results from last 7 days   Lab Units 05/14/21  1921 05/14/21  1731 05/14/21  1329 05/14/21  1309   BLOOD CULTURE  No Growth at 24 hrs  No Growth at 24 hrs   --  No Growth at 24 hrs  URINE CULTURE   --   --  >100,000 cfu/ml Escherichia coli*  --        EKG: V-paced at 100   Imaging: CTA PE - negative for PE, cardiomegaly, contrast reflux to IVC and hepatic veins, small pleural effusions with atelectasis, patchy GGO in BL upper lobes, pericardial effusion    I have personally reviewed pertinent reports  and I have personally reviewed pertinent films in PACS    Intake and Output  I/O       05/14 0701 - 05/15 0700 05/15 0701 - 05/16 0700 05/16 0701 - 05/17 0700    P  O  0 0     I V  (mL/kg) 2613 7 (43 1) 4035 4 (66 6)     NG/GT 0 0     IV Piggyback 450 600     Total Intake(mL/kg) 3063 7 (50 6) 4635 4 (76 5)     Urine (mL/kg/hr) 207 318 (0 2)     Emesis/NG output 30 120     Stool 0 0     Total Output 237 438     Net +2826 7 +4197 4            Unmeasured Stool Occurrence 0 x 1 x         UOP: 318mL /24 hrs     Height and Weights         There is no height or weight on file to calculate BMI    Weight (last 2 days)     Date/Time   Weight    05/15/21 0542   60 6 (133 6)    05/15/21 0541   60 6 (133 6)    05/14/21 1625   57 6 (126 99)                Nutrition       Diet Orders   (From admission, onward)             Start     Ordered    05/16/21 0608  Diet NPO  Diet effective now     Question Answer Comment   Diet Type NPO    RD to adjust diet per protocol?  Yes        05/16/21 0607                Active Medications  Scheduled Meds:  Current Facility-Administered Medications   Medication Dose Route Frequency Provider Last Rate    aspirin  81 mg Oral Daily JOSS [place]      Calcium Gluconate  2 g Intravenous Once Malcolmstacy Robles DO 2 g (05/16/21 1120)    chlorhexidine  15 mL Mouth/Throat Q12H [address] Larissa Friedman PA-C      fentaNYL  75 mcg/hr Intravenous Continuous Jostin Ruiz 37 mcg/hr (05/16/21 6757)    fentanyl citrate (PF)           fentanyl citrate (PF)  50 mcg Intravenous Q2H PRN Larissa Friedman PA-C      fomepizole (ANTIZOL) IVPB  15 mg/kg Intravenous Once Yola Sweeney DO      heparin (porcine)  3-30 Units/kg/hr (Order-Specific) Intravenous Titrated Prem Dodson MD Stopped (05/16/21 3826)    heparin (porcine)  2,200 Units Intravenous Q1H PRN Prem Dodson MD      heparin (porcine)  4,400 Units Intravenous Q1H PRN Prem Dodson MD      hydrocortisone sodium succinate  50 mg Intravenous Q6H [address] Jostin Ruiz      insulin lispro  1-5 Units Subcutaneous Q6H Jostin Ruiz      Labetalol HCl  10 mg Intravenous Q6H PRN Konrad Santoyo CRNP      levETIRAcetam  500 mg Intravenous Q12H [address] Ruthiejuarez Ferrera PA-C 500 mg (05/16/21 0914)    lidocaine  1 patch Topical Daily Larissa Friedman PA-C      methylene blue IVPB  100 mg Intravenous Q6H Larissa Friedman PA-C      milrinone [facility]) infusion  0 25 mcg/kg/min Intravenous Continuous Arlys Calrebekah CRNP 0 25 mcg/kg/min (05/15/21 7498)    norepinephrine  1-30 mcg/min Intravenous Titrated Larissa Friedman PA-C 30 mcg/min (05/16/21 1121)    NxStage K 4/Ca 3  20,000 mL Dialysis Continuous David Gerard MD      omeprazole (PRILOSEC) suspension 2 mg/mL  20 mg Oral Daily Jostin Ruiz      oxyCODONE  2 5 mg Oral Q4H PRN Larissa Friedman PA-C      Or    oxyCODONE  5 mg Oral Q4H PRN Mildred Nalini, PA-C      piperacillin-tazobactam  3 375 g Intravenous Q8H [facility], [place] 3 375 g (05/16/21 0934)    potassium chloride  40 mEq Intravenous Once Oscar DO Nick      senna-docusate sodium  1 tablet Oral BID Mildred Nalini, PA-C      sodium bicarbonate infusion  100 mL/hr Intravenous Continuous Mildred Nalini, PA-C 100 mL/hr (05/16/21 1004)    sodium bicarbonate  100 mEq Intravenous Once [facility] [place]      vasopressin  0 04 Units/min Intravenous Continuous Mildred Nalini, PA-C 0 04 Units/min (05/16/21 0714)     Continuous Infusions:  fentaNYL, 75 mcg/hr, Last Rate: 75 mcg/hr (05/16/21 0917)  heparin (porcine), 3-30 Units/kg/hr (Order-Specific), Last Rate: Stopped (05/16/21 0836)  milrinone (PRIMACOR) infusion, 0 25 mcg/kg/min, Last Rate: 0 25 mcg/kg/min (05/15/21 2318)  norepinephrine, 1-30 mcg/min, Last Rate: 30 mcg/min (05/16/21 1121)  NxStage K 4/Ca 3, 20,000 mL  sodium bicarbonate infusion, 100 mL/hr, Last Rate: 100 mL/hr (05/16/21 1004)  vasopressin, 0 04 Units/min, Last Rate: 0 04 Units/min (05/16/21 0714)      PRN Meds:   fentanyl citrate (PF), 50 mcg, Q2H PRN  heparin (porcine), 2,200 Units, Q1H PRN  heparin (porcine), 4,400 Units, Q1H PRN  Labetalol HCl, 10 mg, Q6H PRN  oxyCODONE, 2 5 mg, Q4H PRN    Or  oxyCODONE, 5 mg, Q4H PRN        Allergies   Not on File  ---------------------------------------------------------------------------------------  Advance Directive and Living Will:      Power of :    POLST:    ---------------------------------------------------------------------------------------  Care Time Delivered:   Upon my evaluation, this patient had a high probability of imminent or life-threatening deterioration due to Cardiogenic shock, metabolic acidosis, respiratory failure, which required my direct attention, intervention, and personal management    I have personally provided Forty-five minutes (0715 to 0800) of critical care time, exclusive of procedures, teaching, family meetings, and any prior time recorded by providers other than myself  Winnie Koroma PA-C      Portions of the record may have been created with voice recognition software  Occasional wrong word or "sound a like" substitutions may have occurred due to the inherent limitations of voice recognition software    Read the chart carefully and recognize, using context, where substitutions have occurred

## 2021-05-16 NOTE — PROGRESS NOTES
RN reached out at request of family to have sacrament of the sick given  Ms Tabatha Sales coming to [facility] confirmed with RN Johnna Dominguez and called daughter Rosa Baker to inform of SOS and offer support by phone  Rosa Baker is in the company of family and reported no support needs

## 2021-05-16 NOTE — DISCHARGE SUMMARY
Discharge Summary - Amilcar Pearson 79 y o  female MRN: [id number]    Unit/Bed#: [facility] 321-41 Encounter: [id number] PCP: Concetta Yao DO    Admission Date:   Admission Orders (From admission, onward)     Ordered        05/14/21 1501  Inpatient Admission  Once                     Admitting Diagnosis: STEMI (ST elevation myocardial infarction) [facility]) [I21 3]  Cardiac arrest ([facility] [address] ) [I46 9]    HPI:  79 y o  female  PMH CAD with inferior wall MI s/p CABG in 2007, HTN, GERD, spinal stenosis, tobacco use who presented to [facility] in cardiac arrest  Today, 5/14 patient's landlord checked on patient after family had not heard from her, found her sitting on the toilet only able to whisper and then was unresponsive, EMS was activated  Down time approximately  6 min prior to EMS arrival when patient was found to be pulseless and CPR initiated, rhythm was asystole  ROSC was achieved in ER after approximately 52 minutes of CPR  ECG at this time was concerning for STEMI  Transferred to [facility] for interventional cardiology  Arrives to the ICU on Epinephrine 15 and Levophed at 30 which has since been weaning down  Procedures Performed:   Orders Placed This Encounter   Procedures    Cardiac coronary angio/lhc/pci    Central Line    Intubation    POC Cardiac US    POC Cardiac US    POC DVT/PE US    Temporary HD Catheter       Summary of Hospital Course:  Upon arrival patient was found to be hypothermic with temperature 32° C, she was being rewarming  She developed features of tachy-marysol syndrome subsequently a right IJ sheath and transvenous wire replaced  Patient had improvement in her hypotension and acidosis in her first 24hrs @ [facility]  She began following commands on 5/15 and was extubated to nasal cannula however throughout the day patient worsening hypotension requiring pressor re-initiation  Then had worsening metabolic acidosis  Echo demonstrated EF 35%, significant RV dysfunction    Patient had worsening metabolic acidosis and hypotension overnight from the  and   Milrinone was started for inotropics support  Decision was made to start renal replacement therapy, a central line was placed the patient was hooked up to CVVH  Patient developed tachypnea increased work of breathing likely compensatory mechanism for her metabolic acidosis, she was placed on BiPAP in intubated shortly thereafter on   A CTA PE study was obtained, negative for PE  Concern for acute coronary syndrome, patient was taken to cath lab coronary angiography was unrevealing  Patient continued to have severe metabolic acidosis and hypotension, workup was started for possible toxic alcohol ingestion although patient was are being treated with renal replacement therapy, fomepizole  Throughout the course the day on  the patient continued to have worsened hypotension she was maxed on Levophed, vasopressin  She then developed roving eye movements and altered mental status, concern for breakthrough seizure  The patient's case was discussed with her daughter Richmond Gurrola who decided to transition her mother to comfort measures only  Subsequently vasopressor support was withdrawn and the patient was compassionately extubated, she  shortly thereafter  Significant Findings, Care, Treatment and Services Provided:   Intubation  Vasopressor therapy  Renal replacement therapy  Cardiac catheterization    Complications:  Not applicable    Disposition:      Final Diagnosis:  Cardiac arrest, acute right ventricular failure, metabolic acidosis, shock    Resolved Problems  Date Reviewed: 2021    None              Date, Time and Cause of Death    Date of Death: 21  Time of Death:  7:39 PM  Preliminary Cause of Death: Right heart failure ([facility] [address] )  Entered by: Kathleen Doty [RR1 1]     Attribution     RR1  [id number] [place], [place] 21 19:45          Death Note:    INPATIENT DEATH NOTE  Marj Bailon 79 y o  female MRN: [id number]  [facility] Encounter: [id number]    Date, Time and Cause of Death    Date of Death: 21  Time of Death:  7:39 PM  Preliminary Cause of Death: Right heart failure ([address] )  Entered by: Maria C Mcguire [RR1 1]     Attribution     RR1  [id number] Celso Verdin YEIMY Lagunas 21 19:45           Patient's Information  Pronounced by: Maria C Mcguire  Did the patient's death occur in the ED?: No  Did the patient's death occur in the OR?: No  Did the patient's death occur less than 10 days post-op?: No  Did the patient's death occur within 24 hours of admission?: No  Was code status DNR at the time of death?: Yes    PHYSICAL EXAM:  Unresponsive to noxious stimuli, Spontaneous respirations absent, Breath sounds absent, Carotid pulse absent and Heart sounds absent    Medical Examiner notification criteria:  Death in which attending physician has no adequate or reasonable explanation of the cause of death   Medical Examiner's office notified?:  Yes   Medical Examiner accepted case?:  No  Name of Medical Examiner:  [address] Notification  Was the family notified?: Yes  Date Notified: 21  Time Notified:   Notified by: Maria C Mcguire  Name of Family Notified of Death: Mala   Relationship to Patient: Daughter  Family Notification Route: Telephone  Was the family told to contact a  home?: Yes  Name of [facility][de-identified] [address]    Autopsy Options:  Post-mortem examination declined by next of kin    Primary Service Attending Physician notified?:  yes - Attending:  Emile Powers MD    Physician/Resident responsible for completing Discharge Summary:  Maria C Mcguire PA-C

## 2021-05-16 NOTE — PROGRESS NOTES
Cardiology Progress Note   Rae Berger 79 y o  female MRN: [id number]  Unit/Bed#: [facility] 045-02 Encounter: [address] Course/Assessment  67yo woman with CAD s/p CABG s/p inferior MI (2007) who presented with cardiac arrest  ECG on admission with concerning changes of JAZZMINE in anterior and inferior leads but largely unchanged  On admission echo showing RV dysfunction thought to be due to PE but CTA negative for PE  On admission ECG abnormal with JAZZMINE in anterior and inferior leads which have largely not changed  LHC today patent grafts  CT report does mention right heart enlargement consistent with echo and reflux of contrast into IVC and hepatic veins suggestive elevated right heart pressures  Also superimposed infection likely given ground glass opacities in lungs  Transient AV block but resolved  Subjective: Today underwent LHC after intubation  LVEDP normal 12 mmHg  LAD, RCA, %   SVG/OM1, OM2 and LIMA/LAD grafts are patent  Cardiac Imaging  - TTE (5/14/21): EF 55%, AK basal-mid inf wall, abnormal diastolic function, high LV filling pressures  RV mildly dilated, markedly reduced, HK basal-mid free wall sparing RV apex, mild MR, mild AI    Plan  1  Severe RV dysfunction  - Reflux seen on CTA  2  CAD s/p CABG and inferior wall MI (2007)  - Aspirin 81mg  - On heparin gtt  - Wean pressors as tolerates     3  Acute hypoxic respiratory failure, intubated    4  Transient AV block s/p temp PPM    5  BL LE DVT: hepain gtt    6  Severe LVH    Vlad Nagy MD  - PGY-5 Cardiology Fellow  [facility] text enabled    ======================================================  Objective  VS: Blood pressure (!) 79/54, pulse 98, temperature (!) 94 6 °F (34 8 °C), resp  rate (!) 24, weight 60 6 kg (133 lb 9 6 oz), SpO2 94 %  Gen: well appearing  Psych: AOx3  Skin: intact  Cardiac: S1, S2, regular rate, no S3 or S4 appreciated  No murmurs  +2 PT, radial pulses   No peripheral edema No carotid bruits  Resp: CTABL  No crackles  MSK: 5/5 strength throughout muscle groups  Neuro: CN grossly intact  Sensory to light touch, pain, proprioception intact BL LE, UE  LN: no cervical LAD  Rheum: no joint deformities in UE or LE  ======================================================  TREADMILL STRESS  No results found for this or any previous visit    ----------------------------------------------------------------------------------------------  NUCLEAR STRESS TEST: No results found for this or any previous visit  No results found for this or any previous visit     --------------------------------------------------------------------------------  CATH:  No results found for this or any previous visit   --------------------------------------------------------------------------------  ECHO:   Results for orders placed during the hospital encounter of 21   Echo complete with contrast if indicated    Patrick Parrish [address]  [address] [place] [address]  [phone]    Transthoracic Echocardiogram  2D, M-mode, Doppler, and Color Doppler    Study date:  14-May-2021    Patient: Do Chiu  MR number: [id number]  Account number: [de-identified]  : 1950  Age: 79 years  Gender: Female  Status: Inpatient  Location: Bedside  Height: 62 in  Weight: 124 lb  BP: 87/ 71 mmHg    Indications: Cardiac arrest    Diagnoses: I49 9 - Cardiac arrhythmia, unspecified    Sonographer:  Marlyn Doyle  Primary Physician:  Liliya Ramirez DO  Referring Physician:  Zhang Ramos PA-C  Group:  [facility]  Interpreting Physician:  Yanelis Zarate DO    SUMMARY    PROCEDURE INFORMATION:  This was a technically difficult study  LEFT VENTRICLE:  Systolic function was normal  Ejection fraction was estimated to be 55 %  There was akinesis of the basal-mid inferior wall(s)  Wall thickness was increased    Left ventricular diastolic function parameters were abnormal   Doppler parameters were consistent with high ventricular filling pressure  RIGHT VENTRICLE:  The ventricle was mildly dilated  Systolic function was markedly reduced  There is severe hypokinesis of the basal-mid free wall with sparing of the RV apex  MITRAL VALVE:  There was mild regurgitation  AORTIC VALVE:  There was mild regurgitation  TRICUSPID VALVE:  There was moderate regurgitation  Estimated peak PA pressure was 52 mmHg  The findings suggest moderate pulmonary hypertension  PERICARDIUM:  A small, free-flowing pericardial effusion was identified circumferential to the heart  There was no evidence of hemodynamic compromise  HISTORY: PRIOR HISTORY: Coronary artery disease; GERD; Hypertension; CABG    PROCEDURE: The procedure was performed at the bedside  This was a routine study  The transthoracic approach was used  The study included complete 2D imaging, M-mode, complete spectral Doppler, and color Doppler  The heart rate was 66 bpm,  at the start of the study  Images were obtained from the parasternal, apical, subcostal, and suprasternal notch acoustic windows  Echocardiographic views were limited due to lung interference and patient on mechanical ventilator  This was  a technically difficult study  LEFT VENTRICLE: Size was normal  Systolic function was normal  Ejection fraction was estimated to be 55 %  There was akinesis of the basal-mid inferior wall(s)  Wall thickness was increased  DOPPLER: Left ventricular diastolic function  parameters were abnormal  Doppler parameters were consistent with high ventricular filling pressure  VENTRICULAR SEPTUM: There was diastolic flattening  These changes are consistent with RV volume overload  RIGHT VENTRICLE: The ventricle was mildly dilated  Systolic function was markedly reduced  There is severe hypokinesis of the basal-mid free wall with sparing of the RV apex      LEFT ATRIUM: Size was at the upper limits of normal     RIGHT ATRIUM: Size was at the upper limits of normal     MITRAL VALVE: Valve structure was normal  There was normal leaflet separation  DOPPLER: The transmitral velocity was within the normal range  There was no evidence for stenosis  There was mild regurgitation  AORTIC VALVE: The valve was trileaflet  Leaflets exhibited normal cuspal separation and sclerosis  DOPPLER: Transaortic velocity was within the normal range  There was no evidence for stenosis  There was mild regurgitation  TRICUSPID VALVE: The valve structure was normal  There was normal leaflet separation  DOPPLER: The transtricuspid velocity was within the normal range  There was no evidence for stenosis  There was moderate regurgitation  Pulmonary artery  systolic pressure was mildly to moderately increased  Estimated peak PA pressure was 52 mmHg  The findings suggest moderate pulmonary hypertension  PULMONIC VALVE: Leaflets exhibited normal thickness, no calcification, and normal cuspal separation  DOPPLER: The transpulmonic velocity was within the normal range  There was trace regurgitation  PERICARDIUM: A small, free-flowing pericardial effusion was identified circumferential to the heart  There was no evidence of hemodynamic compromise  AORTA: The root exhibited normal size  SYSTEMIC VEINS: IVC: The inferior vena cava was mildly dilated  Respirophasic changes were blunted (less than 50% variation)      SYSTEM MEASUREMENT TABLES    2D  %FS: 27 72 %  Ao Diam: 3 2 cm  EDV(Teich): 51 69 ml  EF(Teich): 54 78 %  ESV(Teich): 23 37 ml  IVSd: 1 42 cm  LA Area: 18 89 cm2  LA Diam: 3 4 cm  LVEDV MOD A4C: 57 26 ml  LVEF MOD A4C: 56 71 %  LVESV MOD A4C: 24 79 ml  LVIDd: 3 52 cm  LVIDs: 2 55 cm  LVLd A4C: 7 17 cm  LVLs A4C: 6 23 cm  LVPWd: 1 37 cm  RA Area: 18 73 cm2  RVIDd: 3 39 cm  SV MOD A4C: 32 47 ml  SV(Teich): 28 32 ml    CW  TR Vmax: 3 04 m/s  TR maxP 85 mmHg    MM  TAPSE: 0 95 cm    PW  E' Sept: 0 04 m/s  E/E' Sept: 14 96  MV A Ben: 1 03 m/s  MV Dec [place]: 3 48 m/s2  MV DecT: 160 47 ms  MV E Ben: 0 56 m/s  MV E/A Ratio: 0 54  MV PHT: 46 54 ms  MVA By PHT: 4 73 cm2    [employer] Accredited Echocardiography Laboratory    Prepared and electronically signed by    Jc Schmidt DO  Signed 15-May-2021 13:20:05       No results found for this or any previous visit   --------------------------------------------------------------------------------  HOLTER  No results found for this or any previous visit   --------------------------------------------------------------------------------  CAROTIDS  No results found for this or any previous visit       [unfilled]   =====================================================    Active Meds    Current Facility-Administered Medications:     albumin human (FLEXBUMIN) 5 % injection **ADS Override Pull**, , , ,     aspirin chewable tablet 81 mg, 81 mg, Oral, Daily, [facility], 81 mg at 05/16/21 3897    chlorhexidine (PERIDEX) 0 12 % oral rinse 15 mL, 15 mL, Mouth/Throat, Q12H [address], Rojas Pedro [facility], 15 mL at 05/16/21 0929    EPINEPHrine (ADRENALIN) 0 1 mg/mL injection **ADS Override Pull**, , , ,     EPINEPHrine PF (ADRENALIN) 1 mg/mL injection **ADS Override Pull**, , , ,     etomidate (AMIDATE) 2 mg/mL injection 20 mg, 20 mg, Intravenous, Once, Nina Horan PA-C    fentaNYL 1000 mcg in sodium chloride 0 9% 100mL infusion, 75 mcg/hr, Intravenous, Continuous, Nina Horan PA-C, Last Rate: 7 5 mL/hr at 05/16/21 0917, 75 mcg/hr at 05/16/21 0917    fentanyl citrate (PF) 100 MCG/2ML **ADS Override Pull**, , , ,     fentanyl citrate (PF) 100 MCG/2ML **ADS Override Pull**, , , ,     fentanyl citrate (PF) 100 MCG/2ML 100 mcg, 100 mcg, Intravenous, Once, Nina Horan PA-C    fentanyl citrate (PF) 100 MCG/2ML 50 mcg, 50 mcg, Intravenous, Q2H PRN, Nina Horan PA-C    heparin (porcine) 25,000 units in 0 45% NaCl 250 mL infusion (premix), 3-30 Units/kg/hr (Order-Specific), Intravenous, Angelita Levi MD, Stopped at 05/16/21 0836    heparin (porcine) injection 2,200 Units, 2,200 Units, Intravenous, Q1H PRN, Disha Mendoza MD    heparin (porcine) injection 4,400 Units, 4,400 Units, Intravenous, Q1H PRN, Disha Mendoza MD    hydrocortisone sodium succinate (PF) (Solu-CORTEF) injection 50 mg, 50 mg, Intravenous, Q6H [facility], Hakan Hutchinson PA-C, 50 mg at 05/16/21 2623    insulin lispro (HumaLOG) 100 units/mL subcutaneous injection 1-5 Units, 1-5 Units, Subcutaneous, 4x Daily (with meals and at bedtime) **AND** Fingerstick Glucose (POCT), , , 4x Daily AC and at bedtime, Hakan Hutchinson PA-C    Labetalol HCl (NORMODYNE) injection 10 mg, 10 mg, Intravenous, Q6H PRN, FLAKO Palacios    levETIRAcetam (KEPPRA) 500 mg in sodium chloride 0 9 % 100 mL IVPB, 500 mg, Intravenous, Q12H [facility], Ruthie Ann PA-C, Last Rate: 400 mL/hr at 05/16/21 0934, 500 mg at 05/16/21 0934    lidocaine (LIDODERM) 5 % patch 1 patch, 1 patch, Topical, Daily, Hakan Hutchinson PA-C, 1 patch at 05/16/21 0929    methylene blue (PROVAYBLUE) 100 mg in dextrose 5 % 50 mL IVPB, 100 mg, Intravenous, Once, Hakan Hutchinson PA-C    milrinone [facility]) 20 mg in 100 mL infusion (premix), 0 25 mcg/kg/min, Intravenous, Continuous, FLAKO aPlacios, Last Rate: 4 5 mL/hr at 05/15/21 2318, 0 25 mcg/kg/min at 05/15/21 2318    norepinephrine (LEVOPHED) 4 mg (STANDARD CONCENTRATION) IV in sodium chloride 0 9% 250 mL, 1-30 mcg/min, Intravenous, Titrated, Hakan Hutchinson PA-C, Last Rate: 93 8 mL/hr at 05/16/21 0755, 25 mcg/min at 05/16/21 0755    NxStage K 4/Ca 3 dialysis solution (RFP-401) 20,000 mL, 20,000 mL, Dialysis, Continuous, David Gerard MD, 20,000 mL at 05/16/21 0608    omeprazole (PRILOSEC) suspension 2 mg/mL, 20 mg, Oral, Daily, Hakan Hutchinson PA-C, 20 mg at 05/16/21 0929    oxyCODONE (ROXICODONE) IR tablet 2 5 mg, 2 5 mg, Oral, Q4H PRN **OR** oxyCODONE (ROXICODONE) IR tablet 5 mg, 5 mg, Oral, Q4H PRN, Leonel Hancock YEIMY Lewis    piperacillin-tazobactam (ZOSYN) 3 375 g in sodium chloride 0 9 % 100 mL IVPB, 3 375 g, Intravenous, Q8H, Manju Vaughn PA-C, Last Rate: 25 mL/hr at 05/16/21 0934, 3 375 g at 05/16/21 0934    senna-docusate sodium (SENOKOT S) 8 6-50 mg per tablet 1 tablet, 1 tablet, Oral, BID, Manju Vaughn PA-C, 1 tablet at 05/16/21 1761    sodium bicarbonate 150 mEq in dextrose 5 % 1,000 mL infusion, 100 mL/hr, Intravenous, Continuous, Manju Vaughn PA-C, Last Rate: 100 mL/hr at 05/15/21 2246, 100 mL/hr at 05/15/21 2246    sodium bicarbonate 8 4 % injection 100 mEq, 100 mEq, Intravenous, Once, Manju Vaughn PA-C    Succinylcholine Chloride 100 mg/5 mL syringe 100 mg, 100 mg, Intravenous, Once, Majnu Vaughn PA-C    vasopressin (PITRESSIN) 20 Units in sodium chloride 0 9 % 100 mL infusion, 0 04 Units/min, Intravenous, Continuous, Manju Vaughn PA-C, Last Rate: 12 mL/hr at 05/16/21 0714, 0 04 Units/min at 05/16/21 0714    Labs & Results  Lab Results   Component Value Date    TROPONINI >40 00 (H) 05/16/2021    TROPONINI >40 00 (H) 05/15/2021    TROPONINI >40 00 (H) 05/15/2021     Lab Results   Component Value Date    GLUCOSE 135 05/15/2021    CALCIUM 6 8 (L) 05/16/2021    K 3 8 05/16/2021    CO2 13 (L) 05/16/2021     (H) 05/16/2021    BUN 37 (H) 05/16/2021    CREATININE 2 40 (H) 05/16/2021     Lab Results   Component Value Date    WBC 15 11 (H) 05/16/2021    HGB 8 5 (L) 05/16/2021    HCT 25 7 (L) 05/16/2021    MCV 99 (H) 05/16/2021     05/16/2021     Results from last 7 days   Lab Units 05/14/21 2123   INR  1 35*     No results found for: CHOL  No results found for: HDL  No results found for: LDLCALC  No results found for: TRIG  Lab Results   Component Value Date    ALT 1,465 (H) 05/16/2021    AST 3,559 (H) 05/16/2021

## 2021-05-16 NOTE — DEATH NOTE
INPATIENT DEATH NOTE  Na Nuno 79 y o  female MRN: [id number]  Unit/Bed#: [facility] 010-84 Encounter: [id number]    Date, Time and Cause of Death    Date of Death: 21  Time of Death:  7:39 PM  Preliminary Cause of Death: Right heart failure ([facility] [address] )  Entered by: Saadia Wallace [RR1 1]     Attribution     RR1  [id number] Celso Lagunas PA-C 21 19:45           Patient's Information  Pronounced by: Saadia Wallace  Did the patient's death occur in the ED?: No  Did the patient's death occur in the OR?: No  Did the patient's death occur less than 10 days post-op?: No  Did the patient's death occur within 24 hours of admission?: No  Was code status DNR at the time of death?: Yes    PHYSICAL EXAM:  Unresponsive to noxious stimuli, Spontaneous respirations absent, Breath sounds absent, Carotid pulse absent and Heart sounds absent    Medical Examiner notification criteria:  Death in which attending physician has no adequate or reasonable explanation of the cause of death   Medical Examiner's office notified?:  Yes   Medical Examiner accepted case?:  No  Name of Medical Examiner:  [address] Notification  Was the family notified?: Yes  Date Notified: 21  Time Notified:   Notified by: Saadia Wallace  Name of Family Notified of Death: Mala   Relationship to Patient: Daughter  Family Notification Route: Telephone  Was the family told to contact a  home?: Yes  Name of [place] [address][de-identified] [address]    Autopsy Options:  Post-mortem examination declined by next of kin    Primary Service Attending Physician notified?:  yes - Attending:  Etienne Haji MD    Physician/Resident responsible for completing Discharge Summary:  Saadia Wallace PA-C

## 2021-05-16 NOTE — PROCEDURES
Intubation    Date/Time: 5/16/2021 8:00 AM  Performed by: Adrian Arevalo PA-C  Authorized by: Adrian Arevalo PA-C     Patient location:  Bedside (ICU)  Other Assisting Provider: Yes (comment) (Dr Robbie Massey)    Consent:     Consent obtained:  Emergent situation and verbal    Consent given by:  Patient  Universal protocol:     Patient identity confirmed:  Arm band  Pre-procedure details:     Patient status:  Altered mental status    Pretreatment medications:  Etomidate    Paralytics:  Succinylcholine  Indications:     Indications for intubation: respiratory distress, airway protection and hypoxemia    Procedure details:     Preoxygenation:  BiPAP    CPR in progress: no      Intubation method:  Oral    Oral intubation technique:  Direct    Laryngoscope blade: Mac 3    Tube size (mm):  7 5    Tube type:  Cuffed and hi-lo    Number of attempts:  3 or more    Cricoid pressure: yes      Tube visualized through cords: yes    Placement assessment:     ETT to lip:  24    Tube secured with:  ETT patton    Breath sounds:  Equal and absent over the epigastrium    Placement verification: chest rise, condensation, CXR verification, equal breath sounds and ETCO2 detector      CXR findings:  ETT in proper place  Post-procedure details:     Patient tolerance of procedure: Tolerated well, no immediate complications  Comments:      Initial intubation attempt - esophageal placement without ETCO2  Second attempt with [facility] [place] with difficulty passing ETT through vocal cords  Successful third attempt with boujee  ETCO2 x 6 breaths

## 2021-05-16 NOTE — PLAN OF CARE
Problem: Prexisting or High Potential for Compromised Skin Integrity  Goal: Skin integrity is maintained or improved  Description: INTERVENTIONS:  - Identify patients at risk for skin breakdown  - Assess and monitor skin integrity  - Assess and monitor nutrition and hydration status  - Monitor labs   - Assess for incontinence   - Turn and reposition patient  - Assist with mobility/ambulation  - Relieve pressure over bony prominences  - Avoid friction and shearing  - Provide appropriate hygiene as needed including keeping skin clean and dry  - Evaluate need for skin moisturizer/barrier cream  - Collaborate with interdisciplinary team   - Patient/family teaching  - Consider wound care consult   Outcome: Progressing     Problem: Potential for Falls  Goal: Patient will remain free of falls  Description: INTERVENTIONS:  - Assess patient frequently for physical needs  -  Identify cognitive and physical deficits and behaviors that affect risk of falls    -  [place] fall precautions as indicated by assessment   - Educate patient/family on patient safety including physical limitations  - Instruct patient to call for assistance with activity based on assessment  - Modify environment to reduce risk of injury  - Consider OT/PT consult to assist with strengthening/mobility  Outcome: Progressing     Problem: PAIN - ADULT  Goal: Verbalizes/displays adequate comfort level or baseline comfort level  Description: Interventions:  - Encourage patient to monitor pain and request assistance  - Assess pain using appropriate pain scale  - Administer analgesics based on type and severity of pain and evaluate response  - Implement non-pharmacological measures as appropriate and evaluate response  - Consider cultural and social influences on pain and pain management  - Notify physician/advanced practitioner if interventions unsuccessful or patient reports new pain  Outcome: Progressing     Problem: INFECTION - ADULT  Goal: Absence or prevention of progression during hospitalization  Description: INTERVENTIONS:  - Assess and monitor for signs and symptoms of infection  - Monitor lab/diagnostic results  - Monitor all insertion sites, i e  indwelling lines, tubes, and drains  - Monitor endotracheal if appropriate and nasal secretions for changes in amount and color  - [place] appropriate cooling/warming therapies per order  - Administer medications as ordered  - Instruct and encourage patient and family to use good hand hygiene technique  - Identify and instruct in appropriate isolation precautions for identified infection/condition  Outcome: Progressing  Goal: Absence of fever/infection during neutropenic period  Description: INTERVENTIONS:  - Monitor WBC    Outcome: Progressing     Problem: SAFETY ADULT  Goal: Patient will remain free of falls  Description: INTERVENTIONS:  - Assess patient frequently for physical needs  -  Identify cognitive and physical deficits and behaviors that affect risk of falls    -  [place] fall precautions as indicated by assessment   - Educate patient/family on patient safety including physical limitations  - Instruct patient to call for assistance with activity based on assessment  - Modify environment to reduce risk of injury  - Consider OT/PT consult to assist with strengthening/mobility  Outcome: Progressing  Goal: Maintain or return to baseline ADL function  Description: INTERVENTIONS:  -  Assess patient's ability to carry out ADLs; assess patient's baseline for ADL function and identify physical deficits which impact ability to perform ADLs (bathing, care of mouth/teeth, toileting, grooming, dressing, etc )  - Assess/evaluate cause of self-care deficits   - Assess range of motion  - Assess patient's mobility; develop plan if impaired  - Assess patient's need for assistive devices and provide as appropriate  - Encourage maximum independence but intervene and supervise when necessary  - Involve family in performance of ADLs  - Assess for home care needs following discharge   - Consider OT consult to assist with ADL evaluation and planning for discharge  - Provide patient education as appropriate  Outcome: Progressing  Goal: Maintain or return mobility status to optimal level  Description: INTERVENTIONS:  - Assess patient's baseline mobility status (ambulation, transfers, stairs, etc )    - Identify cognitive and physical deficits and behaviors that affect mobility  - Identify mobility aids required to assist with transfers and/or ambulation (gait belt, sit-to-stand, lift, walker, cane, etc )  - [place] fall precautions as indicated by assessment  - Record patient progress and toleration of activity level on Mobility SBAR; progress patient to next Phase/Stage  - Instruct patient to call for assistance with activity based on assessment  - Consider rehabilitation consult to assist with strengthening/weightbearing, etc   Outcome: Progressing     Problem: DISCHARGE PLANNING  Goal: Discharge to home or other facility with appropriate resources  Description: INTERVENTIONS:  - Identify barriers to discharge w/patient and caregiver  - Arrange for needed discharge resources and transportation as appropriate  - Identify discharge learning needs (meds, wound care, etc )  - Arrange for interpretive services to assist at discharge as needed  - Refer to Case Management Department for coordinating discharge planning if the patient needs post-hospital services based on physician/advanced practitioner order or complex needs related to functional status, cognitive ability, or social support system  Outcome: Progressing     Problem: Knowledge Deficit  Goal: Patient/family/caregiver demonstrates understanding of disease process, treatment plan, medications, and discharge instructions  Description: Complete learning assessment and assess knowledge base    Interventions:  - Provide teaching at level of understanding  - Provide teaching via preferred learning methods  Outcome: Progressing

## 2021-05-16 NOTE — RESPIRATORY THERAPY NOTE
RT Ventilator Management Note  Claudio Darden 79 y o  female MRN: [id number]  Unit/Bed#: [facility] 163-47 Encounter: [id number]      Daily Screen       5/15/2021  0840 5/16/2021  0900          Patient safety screen outcome[de-identified]  --  Failed  (Pended)       Spont breathing trial outcome[de-identified]  --  Failed  (Pended)       Consider Cuff Test:  Yes  --      Patient extubated:  Yes  --              Physical Exam:          Resp Comments: Called to bedside for intubation  Assissed Dr Cherise Steward  +ETCO2 bilat BS  Pt  then taken to Cath lab  Pt  tolerated transport well without incident  Pt transported back to room 514

## 2021-05-16 NOTE — PROGRESS NOTES
Vancomycin Assessment    Real Valdez is a 79 y o  female who is currently receiving vancomycin 1500 mg IV once for MRSA suspected  Relevant clinical data and objective history reviewed:  Creatinine   Date Value Ref Range Status   05/16/2021 1 96 (H) 0 60 - 1 30 mg/dL Final     Comment:     Standardized to IDMS reference method   05/16/2021 1 97 (H) 0 60 - 1 30 mg/dL Final     Comment:     Standardized to IDMS reference method   05/16/2021 2 28 (H) 0 60 - 1 30 mg/dL Final     Comment:     Standardized to IDMS reference method     BP (!) 79/54 (BP Location: Right arm)   Pulse 100   Temp (!) 96 8 °F (36 °C) (Bladder)   Resp 22   Wt 60 6 kg (133 lb 9 6 oz)   SpO2 97%   I/O last 3 completed shifts: In: 7973 6 [I V :6744 6; IV Piggyback:1229]  Out: 663 [Urine:513; Emesis/NG output:150]  Lab Results   Component Value Date/Time    BUN 29 (H) 05/16/2021 03:37 PM    WBC 15 11 (H) 05/16/2021 04:39 AM    HGB 8 7 (L) 05/16/2021 10:15 AM    HCT 25 7 (L) 05/16/2021 04:39 AM    MCV 99 (H) 05/16/2021 04:39 AM     05/16/2021 04:39 AM     Temp Readings from Last 3 Encounters:   05/16/21 (!) 96 8 °F (36 °C) (Bladder)   05/14/21 (!) 96 5 °F (35 8 °C) (Temporal)     Vancomycin Days of Therapy: 1    Assessment/Plan  The patient is currently on vancomycin utilizing scheduled dosing based on actual body weight  The patient is currently receiving 1500 mg IV once and after clinical evaluation will be changed to 1250 mg q24h  Pharmacy will also follow closely for s/sx of nephrotoxicity, infusion reactions, and appropriateness of therapy  BMP and CBC will be ordered per protocol  Plan for trough as patient approaches steady state, prior to the 3rd  dose at approximately 1700 on 5/18  Due to infection severity, will target a trough of 15-20 (appropriate for most indications)  Pharmacy will continue to follow the patients culture results and clinical progress daily      Lon Cochran, Pharmacist

## 2021-05-17 LAB
BASE EXCESS BLDA CALC-SCNC: -11 MMOL/L (ref -2–3)
BASE EXCESS BLDA CALC-SCNC: -19 MMOL/L (ref -2–3)
DS:DELIVERY SYSTEM: ABNORMAL
DS:DELIVERY SYSTEM: ABNORMAL
ETHYLENE GLYCOL SERPLBLD-MCNC: NEGATIVE UG/ML
FIO2 GAS DIL.REBREATH: 100 L
FIO2 GAS DIL.REBREATH: 40 L
GLUCOSE SERPL-MCNC: 192 MG/DL (ref 65–140)
GLUCOSE SERPL-MCNC: 209 MG/DL (ref 65–140)
GLYCOLATE SERPL-MCNC: NEGATIVE
HCO3 BLDA-SCNC: 10.3 MMOL/L (ref 22–28)
HCO3 BLDA-SCNC: 13.3 MMOL/L (ref 22–28)
HCT VFR BLD CALC: 23 % (ref 34.8–46.1)
HCT VFR BLD CALC: 33 % (ref 34.8–46.1)
HGB BLDA-MCNC: 11.2 G/DL (ref 11.5–15.4)
HGB BLDA-MCNC: 7.8 G/DL (ref 11.5–15.4)
METHANOL SERPL-MCNC: NEGATIVE MG/DL
PCO2 BLD: 11 MMOL/L (ref 21–32)
PCO2 BLD: 14 MMOL/L (ref 21–32)
PCO2 BLD: 25 MM HG (ref 36–44)
PCO2 BLD: 34.8 MM HG (ref 36–44)
PH BLD: 7.08 [PH] (ref 7.35–7.45)
PH BLD: 7.34 [PH] (ref 7.35–7.45)
PO2 BLD: 103 MM HG (ref 75–129)
PO2 BLD: 84 MM HG (ref 75–129)
POTASSIUM BLD-SCNC: 3.6 MMOL/L (ref 3.5–5.3)
POTASSIUM BLD-SCNC: 3.8 MMOL/L (ref 3.5–5.3)
SAO2 % BLD FROM PO2: 95 % (ref 60–85)
SAO2 % BLD FROM PO2: 96 % (ref 60–85)
SODIUM BLD-SCNC: 143 MMOL/L (ref 136–145)
SODIUM BLD-SCNC: 144 MMOL/L (ref 136–145)
SPECIMEN SOURCE: ABNORMAL
SPECIMEN SOURCE: ABNORMAL
VENTILATION VALUE: ABNORMAL
VENTILATION VALUE: ABNORMAL

## 2021-05-17 NOTE — CLINICAL RISK MANAGEMENT
Progress Note - Death in Restraints   Jeanine Rocha 79 y o  female MRN: [id number]  Unit/Bed#: [facility] 365-78 Encounter: [id number]      Patient  while restrained  with Soft restraint right wrist and Soft restraint left wrist  Death unrelated to use of restraints  This situation was tracked internally  CMS and PA-SHANNAN  notification not required

## 2021-05-18 LAB
AMITRIP SERPL-MCNC: 24 NG/ML
AMITRIP SERPL-MCNC: 57 NG/ML
AMITRIP+NOR SERPL-MCNC: 141 NG/ML (ref 80–200)
AMITRIP+NOR SERPL-MCNC: 96 NG/ML (ref 80–200)
NORTRIP SERPL-MCNC: 72 NG/ML
NORTRIP SERPL-MCNC: 84 NG/ML

## 2021-05-19 LAB
BACTERIA BLD CULT: NORMAL

## 2021-06-01 NOTE — UTILIZATION REVIEW
Pt was admitted as not having insurance  Insurance was justed updated - sent request for a retro review of the admission  Inpatient Admission Authorization Request   NOTIFICATION OF INPATIENT ADMISSION/INPATIENT AUTHORIZATION REQUEST   SERVICING FACILITY:   [facility]  Address: [address] [place] [address], [address] [place] [address]  Tax ID: [id number]  NPI: [id number]  Place of Service: Inpatient [address] [facility] Code: 24     ATTENDING PROVIDER:  Attending Name and NPI#: Amirah Diallo [[id number]]  Address: [address] [place] [address], [address] [place] [address]  Phone: [phone]     UTILIZATION REVIEW CONTACT:  Anay Barrett Utilization   Network Utilization Review Department  Phone: [phone]  Fax: [phone]  Email: [email or web address]     PHYSICIAN ADVISORY SERVICES:  FOR [id number] REVIEW - MEDICAL NECESSITY DENIAL  Phone: [phone]  Fax: [phone]  Email: [email or web address]     TYPE OF REQUEST:  Inpatient Status     ADMISSION INFORMATION:  ADMISSION DATE/TIME: 21 1437  PATIENT DIAGNOSIS CODE/DESCRIPTION:  STEMI (ST elevation myocardial infarction) ([facility] ) [I21 3]  Cardiac arrest ([facility] ) [I46 9]  DISCHARGE DATE/TIME: 2021 11:30 PM  DISCHARGE DISPOSITION (IF DISCHARGED):      IMPORTANT INFORMATION:  Please contact the Anay Barrett directly with any questions or concerns regarding this request  Department voicemails are confidential     Send requests for admission clinical reviews, concurrent reviews, approvals, and administrative denials due to lack of clinical to fax [phone]

## 2021-06-07 NOTE — UTILIZATION REVIEW
Initial Clinical Review    Admission: Date/Time/Statement:     No orders of the defined types were placed in this encounter  ED Arrival Information     Patient not seen in ED -- tx'd from [address] [place] [address] s/p cardiac arrest                     Initial Presentation:  79 y o  female  PMHx CAD with inferior wall MI s/p CABG in 2007, HTN, GERD, spinal stenosis, tobacco use who presented to [facility] in cardiac arrest  Today, 5/14 patient's landlord checked on patient after family had not heard from her, found her sitting on the toilet only able to whisper and then was unresponsive, EMS was activated  Down time ~  6 min prior to EMS arrival when patient was found to be pulseless and CPR initiated, rhythm was asystole  ROSC was achieved in ED after ~ 52 minutes of CPR  ECG at this time was concerning for STEMI  Transferred to [facility] by flight for interventional cardiology  Arrives to the ICU on Epinephrine 15 and Levophed at 30 which has since been weaning down  Admit inpatient critical care --- CT head when stable  AC/VC 22/420/70/8  Hold sedation to assess neuro exam  Continue Keppra  Levophed 6, Epi 2 - wean for MAP goal >65  Bedside POCUS on levophed and epinephrine with preserved LV function, decreased RV function, septal flattening  Start VTE Heparin once head CT complete to rule out ICH  Bradycardia with high degree AV block, bundle branch block, prolonged QTc/  Place TVP bedside for pacing requirements  7 0 with HCO3 10 1 on iSTAT - give 2 amps bicarb  Received 3 L IVF at OSH  Check BMP, LA  Check LE duplex to r/o DVT  UA - + leuks, innumerable WBC and bacteria  Start Zosyn, avoid cefepime with hx seizure disorder        Date: 5/15   Day 2: She was found to have findings consistent with PE on US and was started on VTE heparin gtt  BLE Duplex negative for DVT  TTM was aborted and patient was warmed and kept at 37 degrees, transitioned off arctic sun   She had a temporary pacing wire placed for bradyarrythmias  Pacer turned to back up rate early in the evening and remains in NSR at this time  Her vasopressors have all been weaned off and her acidosis has corrected on her ABG  Her lactic acidosis continues to trend down but has not yet cleared  On-going cautious volume resuscitation with EF 30-35%  SBT and attempt to extubate today  Propofol and fentanyl for sedation while intubated  Change bicarb gtt to isolyte  continue IV Zosyn   Ucx and blood cx pending         ED Triage Vitals   Temperature Pulse Respirations Blood Pressure SpO2   05/14/21 1211 05/14/21 1150 05/14/21 1142 05/14/21 1142 05/14/21 1150   (!) 96 5 °F (35 8 °C) 62 12 (!) 190/101 (!) 56 %      Temp Source Heart Rate Source Patient Position - Orthostatic VS BP Location FiO2 (%)   05/14/21 1211 05/14/21 1142 05/14/21 1142 05/14/21 1142 --   Temporal Monitor Lying Left arm       Pain Score       --                 Wt Readings from Last 1 Encounters:   05/15/21 60 6 kg (133 lb 9 6 oz)     Additional Vital Signs:   Date/Time  Temp  Pulse  Resp  BP  MAP (mmHg)  Arterial Line BP  MAP  SpO2  FiO2 (%)  Calculated FIO2 (%) - Nasal Cannula  Nasal Cannula O2 Flow Rate (L/min)  O2 Device  O2 Interface Device  CO  CI  Patient Position - Orthostatic VS  CVP  CVP (mean)   05/16/21 1800  97 5 °F (36 4 °C)   98   21   --  --  94/50   62 mmHg   96 %   --  --  --  Ventilator  --  --  2 2 L/min/m2  --  --  --   05/16/21 1700  97 2 °F (36 2 °C)Abnormal   100  21  --  --  98/52  64 mmHg  95 %  --  --  --  --  --  --  2 3 L/min/m2  --  --  --   05/16/21 1616  --  --  --  --  --  --  --  97 %  --  --  --  --  --  --  --  --  --  --   05/16/21 1600  96 8 °F (36 °C)Abnormal   100  22  --  --  98/54  66 mmHg  98 %  --  --  --  --  --  --  2 4 L/min/m2  --  --  --   05/16/21 1500  96 1 °F (35 6 °C)Abnormal   100  21  --  --  90/48  60 mmHg  96 %  --  --  --  Ventilator  --  --  2 3 L/min/m2  --  --  --   05/16/21 1400  95 4 °F (35 2 °C)Abnormal   100  21  --  -- 86/46  58 mmHg  92 %  --  --  --  Ventilator  --  --  --  --  --  --   05/16/21 1358  95 4 °F (35 2 °C)Abnormal   100  21  --  --  88/48  60 mmHg  93 %  --  --  --  --  --  --  2 3 L/min/m2  --  --  --   05/16/21 1309  94 6 °F (34 8 °C)Abnormal   86  21  --  --  94/48  62 mmHg  92 %  --  --  --  --  --  --  --  --  --  --   05/16/21 1300  94 6 °F (34 8 °C)Abnormal   86  21  --  --  96/48  62 mmHg  93 %  --  --  --  Ventilator  --  --  2 1 L/min/m2  --  --  --   05/16/21 1230  94 6 °F (34 8 °C)Abnormal   98  21  --  --  108/56  70 mmHg  96 %  --  --  --  --  --  --  --  --  --  --   05/16/21 1200  94 3 °F (34 6 °C)Abnormal   98  21  --  --  102/54  68 mmHg  92 %  --  --  --  Ventilator  --  --  2 4 L/min/m2  --  --  --   05/16/21 1100  94 6 °F (34 8 °C)Abnormal   100  24Abnormal   --  --  84/50  62 mmHg  --  100  --  --  Ventilator  --  --  --  --  --  --   05/16/21 1000  95 4 °F (35 2 °C)Abnormal   98  14  --  --  112/60  76 mmHg  90 %  --  --  --  --  --  --  --  --  --  --   05/16/21 0900  --  --  --  --  --  --  --  94 %  --  --  --  --  --  --  --  --  --  --   05/16/21 0809  94 6 °F (34 8 °C)Abnormal   98  24Abnormal   --  --  --  --  --  --  --  --  --  --  --  --  --  --  --   05/16/21 0806  94 6 °F (34 8 °C)Abnormal   100  36Abnormal   --  --  --  --  --  --  --  --  --  --  --  --  --  --  --   05/16/21 0803  93 9 °F (34 4 °C)Abnormal   98  9Abnormal   --  --  --  --  --  --  --  --  --  --  --  --  --  --  --   05/16/21 0800  90 3 °F (32 4 °C)Abnormal   98  23Abnormal   --  --  --  --  --  --  --  --  --  --  --  --  --  --  --   05/16/21 0757  96 1 °F (35 6 °C)Abnormal   92  25Abnormal   --  --  --  --  --  --  --  --  --  --  --  --  --  --  --   05/16/21 0754  96 1 °F (35 6 °C)Abnormal   98  24Abnormal   --  --  --  --  --  --  --  --  --  --  --  --  --  --  --   05/16/21 0751  95 4 °F (35 2 °C)Abnormal   90  28Abnormal   --  --  --  --  --  --  --  --  --  --  --  --  --  --  --   05/16/21 0748  95 7 °F (35 4 °C)Abnormal   98  24Abnormal   --  --  --  --  --  --  --  --  --  --  --  --  --  --  --         1715  93 2 °F (34 °C)Abnormal   --  --  --  --  --  --  --  --  --  --  --  --  --  --  --  --  --   05/14/21 1700  92 8 °F (33 8 °C)Abnormal   78  16  93/69  84  120/56  76 mmHg  100 %  --  --  --  --  --  --  --  --  --  --   05/14/21 1625  92 8 °F (33 8 °C)Abnormal   66  21  85/46Abnormal   58  76/44  60 mmHg  90 %  --  --  --  Ventilator  --  --  --  Lying  --  --   05/14/21 1617  --  --  --  --  --  --  --  99 %  --  --  --  --  --  --  --  --  --  --   05/14/21 1600  92 8 °F (33 8 °C)Abnormal   54Abnormal   20  92/60  70  102/58  72 mmHg  71 %Abnormal   --  --  --  --  --  --  --  --  --  --   05/14/21 1556  92 8 °F (33 8 °C)Abnormal   60  14  87/71Abnormal   76  106/58  74 mmHg  76 %Abnormal   --  --  --  --  --  --  --  --  --  --   05/14/21 1542  92 8 °F (33 8 °C)Abnormal   64  16  102/55  76  110/62  78 mmHg  78 %Abnormal   --  --  --  --  --  --  --  Lying  --  --   05/14/21 1537  92 8 °F (33 8 °C)Abnormal   68  26Abnormal   85/46Abnormal   62  86/46  60 mmHg  75 %Abnormal   --  --  --  --  --  --  --  --  --  --   05/14/21 1500  --  96  21  95/80  85  172/78  106 mmHg  93 %  --  --  --  --  --  --  --  --  --  --   05/14/21 1455  --  86  22  109/82  100  204/104  132 mmHg  92 %  --  --  --  Ventilator  --  --  --  Lying  --  --   05/14/21 1445  --  88  19  --  --  144/82  100 mmHg  94 %  --  --  --  --  --  --  --  --  --  --   05/14/21 1440  --  72  29Abnormal   --  --  120/74  90 mmHg  91 %  --  --  --  --  --  --  --  --  --  --       Pertinent Labs/Diagnostic Test Results:   CXR 5/14 -- Interval placement of typical right IJ CVP line with tip projecting at superior right atrium, without complication  No acute cardiopulmonary disease  CXR 5/15 -- No acute cardiopulmonary disease     CT c/a/p 5/14 --   1   There is mild bowel wall thickening of the colon in a nonspecific manner which may reflect underlying colitis   Some suspected mild diffuse small bowel wall thickening noted as well   Given the patient's recent cardiac arrest, the possibility of an   ischemic colitis is raised  2   Stranding of the peripancreatic fat raising the possibility of underlying pancreatitis  3   Small amount of abdominal ascites with fluid in the sharlene hepatis and anterior pararenal space and subhepatic space of uncertain etiology, but possibly due to pancreatitis   No free air to suggest perforated viscus although this cannot be entirely   excluded  4   Nonspecific bilateral perinephric stranding   No obstructive uropathy  5   Slightly distended but otherwise unremarkable appearing gallbladder  6   Constipation  7   Cardiomegaly with small pericardial effusion   Transvenous pacing lead present  8   Additional findings as noted  CT head 5/14 --   1   Microangiopathic and remote postischemic change without acute intracranial abnormality noted   Note that changes of hypoxic/ischemic encephalopathy may not manifest on CT imaging in the hyperacute phase, and appropriate clinical correlation and   follow-up imaging suggested                                        No results found for: BETA-HYDROXYBUTYRATE                                                                                       Past Medical History:   Diagnosis Date    CAD (coronary artery disease), native coronary artery 2007    stent and CABG x 3     Cervical spinal stenosis     Gastric ulcer     GERD (gastroesophageal reflux disease)     HTN (hypertension)      Present on Admission:  **None**      Admitting Diagnosis: No admission diagnoses are documented for this encounter    Age/Sex: 79 y o  female  Admission Orders:  Scheduled Medications:  aspirin, 81 mg, Oral, Daily  chlorhexidine, 15 mL, Mouth/Throat, Q12H JOESPH  fentanyl citrate (PF), , ,   hydrocortisone sodium succinate, 50 mg, Intravenous, Q6H JOESPH  insulin lispro, 1-5 Units, Subcutaneous, Q6H  levETIRAcetam, 500 mg, Intravenous, Q12H JOESPH  lidocaine, 1 patch, Topical, Daily  methylene blue IVPB, 100 mg, Intravenous, Q6H  omeprazole (PRILOSEC) suspension 2 mg/mL, 20 mg, Oral, Daily  piperacillin-tazobactam, 3 375 g, Intravenous, Q8H  senna-docusate sodium, 1 tablet, Oral, BID  sodium bicarbonate, 100 mEq, Intravenous, Once  [START ON 5/17/2021] vancomycin, 20 mg/kg, Intravenous, Q24H  vancomycin, 25 mg/kg, Intravenous, Once      Continuous IV Infusions:  No current facility-administered medications for this encounter  PRN Meds:  No current facility-administered medications for this encounter  Network Utilization Review Department  ATTENTION: Please call with any questions or concerns to [phone] and carefully listen to the prompts so that you are directed to the right person  All voicemails are confidential   Terrea Cobia all requests for admission clinical reviews, approved or denied determinations and any other requests to dedicated fax number below belonging to the campus where the patient is receiving treatment   List of dedicated fax numbers for the Facilities:  [address] DENIALS (Administrative/Medical Necessity) [phone]   [address] (Maternity/NICU/Pediatrics) [phone]   [address] Cora Deleon 1488 [address] [place] [address] [address]mh[address] [facility] [phone]       Notification of Discharge   This is a Notification of Discharge from our facility [address]  Please be advised that this patient has been discharge from our facility  Below you will find the admission and discharge date and time including the patients disposition  UTILIZATION REVIEW CONTACT:  Bubba Sandoval  Utilization   Network Utilization Review Department  Phone: [phone] x carefully listen to the prompts  All voicemails are confidential   Email: [email or web address]     PHYSICIAN ADVISORY SERVICES:  FOR [id number] REVIEW - MEDICAL NECESSITY DENIAL  Phone: [phone]  Fax: [phone]  Email: [email or web address]     PRESENTATION DATE: 2021 11:45 AM  OBERVATION ADMISSION DATE:   INPATIENT ADMISSION DATE: N/A N/A   DISCHARGE DATE: 2021  1:46 PM  DISPOSITION:        IMPORTANT INFORMATION:  Send all requests for admission clinical reviews, approved or denied determinations and any other requests to dedicated fax number below belonging to the campus where the patient is receiving treatment   List of dedicated fax numbers:  [address] DENIALS (Administrative/Medical Necessity) [phone]   [address] (Maternity/NICU/Pediatrics) [phone]   Haresh Greenwood [phone]   Cherylene Freud [phone]   Maira Fuentes [phone]   [facility] [address] [place] [address] [phone]   [facility]  [phone]   [address] [facility]  [address] [facility] [address] [facility] [phone]

## 2021-06-16 NOTE — UTILIZATION REVIEW
Initial Clinical Review    Admission: Date/Time/Statement:     No orders of the defined types were placed in this encounter  ED Arrival Information     Patient not seen in ED -- tx'd from [address] [place] [address] s/p cardiac arrest                     Initial Presentation:  79 y o  female  PMHx CAD with inferior wall MI s/p CABG in 2007, HTN, GERD, spinal stenosis, tobacco use who presented to [facility] in cardiac arrest  Today, 5/14 patient's landlord checked on patient after family had not heard from her, found her sitting on the toilet only able to whisper and then was unresponsive, EMS was activated  Down time ~  6 min prior to EMS arrival when patient was found to be pulseless and CPR initiated, rhythm was asystole  ROSC was achieved in ED after ~ 52 minutes of CPR  ECG at this time was concerning for STEMI  Transferred to [facility] by flight for interventional cardiology  Arrives to the ICU on Epinephrine 15 and Levophed at 30 which has since been weaning down  Admit inpatient critical care --- CT head when stable  AC/VC 22/420/70/8  Hold sedation to assess neuro exam  Continue Keppra  Levophed 6, Epi 2 - wean for MAP goal >65  Bedside POCUS on levophed and epinephrine with preserved LV function, decreased RV function, septal flattening  Start VTE Heparin once head CT complete to rule out ICH  Bradycardia with high degree AV block, bundle branch block, prolonged QTc/  Place TVP bedside for pacing requirements  7 0 with HCO3 10 1 on iSTAT - give 2 amps bicarb  Received 3 L IVF at OSH  Check BMP, LA  Check LE duplex to r/o DVT  UA - + leuks, innumerable WBC and bacteria  Start Zosyn, avoid cefepime with hx seizure disorder        Date: 5/15   Day 2: She was found to have findings consistent with PE on US and was started on VTE heparin gtt  BLE Duplex negative for DVT  TTM was aborted and patient was warmed and kept at 37 degrees, transitioned off arctic sun   She had a temporary pacing wire placed for bradyarrythmias  Pacer turned to back up rate early in the evening and remains in NSR at this time  Her vasopressors have all been weaned off and her acidosis has corrected on her ABG  Her lactic acidosis continues to trend down but has not yet cleared  On-going cautious volume resuscitation with EF 30-35%  SBT and attempt to extubate today  Propofol and fentanyl for sedation while intubated  Change bicarb gtt to isolyte  continue IV Zosyn   Ucx and blood cx pending         ED Triage Vitals   Temperature Pulse Respirations Blood Pressure SpO2   05/14/21 1211 05/14/21 1150 05/14/21 1142 05/14/21 1142 05/14/21 1150   (!) 96 5 °F (35 8 °C) 62 12 (!) 190/101 (!) 56 %      Temp Source Heart Rate Source Patient Position - Orthostatic VS BP Location FiO2 (%)   05/14/21 1211 05/14/21 1142 05/14/21 1142 05/14/21 1142 --   Temporal Monitor Lying Left arm       Pain Score       --                 Wt Readings from Last 1 Encounters:   05/15/21 60 6 kg (133 lb 9 6 oz)     Additional Vital Signs:   Date/Time  Temp  Pulse  Resp  BP  MAP (mmHg)  Arterial Line BP  MAP  SpO2  FiO2 (%)  Calculated FIO2 (%) - Nasal Cannula  Nasal Cannula O2 Flow Rate (L/min)  O2 Device  O2 Interface Device  CO  CI  Patient Position - Orthostatic VS  CVP  CVP (mean)   05/16/21 1800  97 5 °F (36 4 °C)   98   21   --  --  94/50   62 mmHg   96 %   --  --  --  Ventilator  --  --  2 2 L/min/m2  --  --  --   05/16/21 1700  97 2 °F (36 2 °C)Abnormal   100  21  --  --  98/52  64 mmHg  95 %  --  --  --  --  --  --  2 3 L/min/m2  --  --  --   05/16/21 1616  --  --  --  --  --  --  --  97 %  --  --  --  --  --  --  --  --  --  --   05/16/21 1600  96 8 °F (36 °C)Abnormal   100  22  --  --  98/54  66 mmHg  98 %  --  --  --  --  --  --  2 4 L/min/m2  --  --  --   05/16/21 1500  96 1 °F (35 6 °C)Abnormal   100  21  --  --  90/48  60 mmHg  96 %  --  --  --  Ventilator  --  --  2 3 L/min/m2  --  --  --   05/16/21 1400  95 4 °F (35 2 °C)Abnormal   100  21  --  -- 86/46  58 mmHg  92 %  --  --  --  Ventilator  --  --  --  --  --  --   05/16/21 1358  95 4 °F (35 2 °C)Abnormal   100  21  --  --  88/48  60 mmHg  93 %  --  --  --  --  --  --  2 3 L/min/m2  --  --  --   05/16/21 1309  94 6 °F (34 8 °C)Abnormal   86  21  --  --  94/48  62 mmHg  92 %  --  --  --  --  --  --  --  --  --  --   05/16/21 1300  94 6 °F (34 8 °C)Abnormal   86  21  --  --  96/48  62 mmHg  93 %  --  --  --  Ventilator  --  --  2 1 L/min/m2  --  --  --   05/16/21 1230  94 6 °F (34 8 °C)Abnormal   98  21  --  --  108/56  70 mmHg  96 %  --  --  --  --  --  --  --  --  --  --   05/16/21 1200  94 3 °F (34 6 °C)Abnormal   98  21  --  --  102/54  68 mmHg  92 %  --  --  --  Ventilator  --  --  2 4 L/min/m2  --  --  --   05/16/21 1100  94 6 °F (34 8 °C)Abnormal   100  24Abnormal   --  --  84/50  62 mmHg  --  100  --  --  Ventilator  --  --  --  --  --  --   05/16/21 1000  95 4 °F (35 2 °C)Abnormal   98  14  --  --  112/60  76 mmHg  90 %  --  --  --  --  --  --  --  --  --  --   05/16/21 0900  --  --  --  --  --  --  --  94 %  --  --  --  --  --  --  --  --  --  --   05/16/21 0809  94 6 °F (34 8 °C)Abnormal   98  24Abnormal   --  --  --  --  --  --  --  --  --  --  --  --  --  --  --   05/16/21 0806  94 6 °F (34 8 °C)Abnormal   100  36Abnormal   --  --  --  --  --  --  --  --  --  --  --  --  --  --  --   05/16/21 0803  93 9 °F (34 4 °C)Abnormal   98  9Abnormal   --  --  --  --  --  --  --  --  --  --  --  --  --  --  --   05/16/21 0800  90 3 °F (32 4 °C)Abnormal   98  23Abnormal   --  --  --  --  --  --  --  --  --  --  --  --  --  --  --   05/16/21 0757  96 1 °F (35 6 °C)Abnormal   92  25Abnormal   --  --  --  --  --  --  --  --  --  --  --  --  --  --  --   05/16/21 0754  96 1 °F (35 6 °C)Abnormal   98  24Abnormal   --  --  --  --  --  --  --  --  --  --  --  --  --  --  --   05/16/21 0751  95 4 °F (35 2 °C)Abnormal   90  28Abnormal   --  --  --  --  --  --  --  --  --  --  --  --  --  --  --   05/16/21 0748  95 7 °F (35 4 °C)Abnormal   98  24Abnormal   --  --  --  --  --  --  --  --  --  --  --  --  --  --  --         1715  93 2 °F (34 °C)Abnormal   --  --  --  --  --  --  --  --  --  --  --  --  --  --  --  --  --   05/14/21 1700  92 8 °F (33 8 °C)Abnormal   78  16  93/69  84  120/56  76 mmHg  100 %  --  --  --  --  --  --  --  --  --  --   05/14/21 1625  92 8 °F (33 8 °C)Abnormal   66  21  85/46Abnormal   58  76/44  60 mmHg  90 %  --  --  --  Ventilator  --  --  --  Lying  --  --   05/14/21 1617  --  --  --  --  --  --  --  99 %  --  --  --  --  --  --  --  --  --  --   05/14/21 1600  92 8 °F (33 8 °C)Abnormal   54Abnormal   20  92/60  70  102/58  72 mmHg  71 %Abnormal   --  --  --  --  --  --  --  --  --  --   05/14/21 1556  92 8 °F (33 8 °C)Abnormal   60  14  87/71Abnormal   76  106/58  74 mmHg  76 %Abnormal   --  --  --  --  --  --  --  --  --  --   05/14/21 1542  92 8 °F (33 8 °C)Abnormal   64  16  102/55  76  110/62  78 mmHg  78 %Abnormal   --  --  --  --  --  --  --  Lying  --  --   05/14/21 1537  92 8 °F (33 8 °C)Abnormal   68  26Abnormal   85/46Abnormal   62  86/46  60 mmHg  75 %Abnormal   --  --  --  --  --  --  --  --  --  --   05/14/21 1500  --  96  21  95/80  85  172/78  106 mmHg  93 %  --  --  --  --  --  --  --  --  --  --   05/14/21 1455  --  86  22  109/82  100  204/104  132 mmHg  92 %  --  --  --  Ventilator  --  --  --  Lying  --  --   05/14/21 1445  --  88  19  --  --  144/82  100 mmHg  94 %  --  --  --  --  --  --  --  --  --  --   05/14/21 1440  --  72  29Abnormal   --  --  120/74  90 mmHg  91 %  --  --  --  --  --  --  --  --  --  --       Pertinent Labs/Diagnostic Test Results:   CXR 5/14 -- Interval placement of typical right IJ CVP line with tip projecting at superior right atrium, without complication  No acute cardiopulmonary disease  CXR 5/15 -- No acute cardiopulmonary disease     CT c/a/p 5/14 --   1   There is mild bowel wall thickening of the colon in a nonspecific manner which may reflect underlying colitis   Some suspected mild diffuse small bowel wall thickening noted as well   Given the patient's recent cardiac arrest, the possibility of an   ischemic colitis is raised  2   Stranding of the peripancreatic fat raising the possibility of underlying pancreatitis  3   Small amount of abdominal ascites with fluid in the sharlene hepatis and anterior pararenal space and subhepatic space of uncertain etiology, but possibly due to pancreatitis   No free air to suggest perforated viscus although this cannot be entirely   excluded  4   Nonspecific bilateral perinephric stranding   No obstructive uropathy  5   Slightly distended but otherwise unremarkable appearing gallbladder  6   Constipation  7   Cardiomegaly with small pericardial effusion   Transvenous pacing lead present  8   Additional findings as noted  CT head 5/14 --   1   Microangiopathic and remote postischemic change without acute intracranial abnormality noted   Note that changes of hypoxic/ischemic encephalopathy may not manifest on CT imaging in the hyperacute phase, and appropriate clinical correlation and   follow-up imaging suggested                                        No results found for: BETA-HYDROXYBUTYRATE                                                                                       Past Medical History:   Diagnosis Date    CAD (coronary artery disease), native coronary artery 2007    stent and CABG x 3     Cervical spinal stenosis     Gastric ulcer     GERD (gastroesophageal reflux disease)     HTN (hypertension)      Present on Admission:  **None**      Admitting Diagnosis: No admission diagnoses are documented for this encounter    Age/Sex: 79 y o  female  Admission Orders:  Scheduled Medications:  aspirin, 81 mg, Oral, Daily  chlorhexidine, 15 mL, Mouth/Throat, Q12H JOESPH  fentanyl citrate (PF), , ,   hydrocortisone sodium succinate, 50 mg, Intravenous, Q6H JOESPH  insulin lispro, 1-5 Units, Subcutaneous, Q6H  levETIRAcetam, 500 mg, Intravenous, Q12H JOESPH  lidocaine, 1 patch, Topical, Daily  methylene blue IVPB, 100 mg, Intravenous, Q6H  omeprazole (PRILOSEC) suspension 2 mg/mL, 20 mg, Oral, Daily  piperacillin-tazobactam, 3 375 g, Intravenous, Q8H  senna-docusate sodium, 1 tablet, Oral, BID  sodium bicarbonate, 100 mEq, Intravenous, Once  [START ON 5/17/2021] vancomycin, 20 mg/kg, Intravenous, Q24H  vancomycin, 25 mg/kg, Intravenous, Once      Continuous IV Infusions:  No current facility-administered medications for this encounter  PRN Meds:  No current facility-administered medications for this encounter  Network Utilization Review Department  ATTENTION: Please call with any questions or concerns to [phone] and carefully listen to the prompts so that you are directed to the right person  All voicemails are confidential   Gordon Oconnor all requests for admission clinical reviews, approved or denied determinations and any other requests to dedicated fax number below belonging to the campus where the patient is receiving treatment  List of dedicated fax numbers for the Facilities:  [address] DENIALS (Administrative/Medical Necessity) [phone]   [address] (Maternity/NICU/Pediatrics) 261 [facility] [address] Cora Deleon [address] [place] [address] [address] [place] [address] [facility] [facility] [phone]       Pt was admitted as not having insurance   Insurance was justed updated - sent request for a retro review of the admission  Inpatient Admission Authorization Request   NOTIFICATION OF INPATIENT ADMISSION/INPATIENT AUTHORIZATION REQUEST   SERVICING FACILITY:   [facility]  Address: [address] [place] [address], [address] [place] [address]  Tax ID: [id number]  NPI: [id number]  Place of Service: Inpatient 4604 [facility]  60W  Place of Service Code: 24     ATTENDING PROVIDER:  Attending Name and NPI#: Elise Be [[id number]]  Address: [address] [place] [address], [address] [place] [address]  Phone: [phone]     UTILIZATION REVIEW CONTACT:  Radha Hearn Utilization   Network Utilization Review Department  Phone: [phone]  Fax: [phone]  Email: [email or web address]     PHYSICIAN ADVISORY SERVICES:  FOR FGLT-WW-YRFV REVIEW - MEDICAL NECESSITY DENIAL  Phone: [phone]  Fax: [phone]  Email: [email or web address]     TYPE OF REQUEST:  Inpatient Status     ADMISSION INFORMATION:  ADMISSION DATE/TIME: 21 1437  PATIENT DIAGNOSIS CODE/DESCRIPTION:  No admission diagnoses are documented for this encounter  DISCHARGE DATE/TIME: 2021  1:46 PM  DISCHARGE DISPOSITION (IF DISCHARGED):      IMPORTANT INFORMATION:  Please contact the Radha Hearn directly with any questions or concerns regarding this request  Department voicemails are confidential     Send requests for admission clinical reviews, concurrent reviews, approvals, and administrative denials due to lack of clinical to fax [phone]  Notification of Discharge   This is a Notification of Discharge from our facility [address]  Please be advised that this patient has been discharge from our facility  Below you will find the admission and discharge date and time including the patients disposition  UTILIZATION REVIEW CONTACT:  Radha Hearn  Utilization   Network Utilization Review Department  Phone: [phone] x carefully listen to the prompts   All voicemails are confidential   Email: [email or web address]     PHYSICIAN ADVISORY SERVICES:  FOR YLAE-FG-FTPT REVIEW - MEDICAL NECESSITY DENIAL  Phone: [phone]  Fax: [phone]  Email: [email or web address]     PRESENTATION DATE: 2021 11:45 AM  OBERVATION ADMISSION DATE:   INPATIENT ADMISSION DATE: N/A N/A   DISCHARGE DATE: 2021  1:46 PM  DISPOSITION:        IMPORTANT INFORMATION:  Send all requests for admission clinical reviews, approved or denied determinations and any other requests to dedicated fax number below belonging to the campus where the patient is receiving treatment   List of dedicated fax numbers:  [address] DENIALS (Administrative/Medical Necessity) [phone]   [address] (Maternity/NICU/Pediatrics) [phone]   Dottie Castillo [phone]   [facility] [phone]   [facility]  [phone]   [facility] [address] [place] [address] [phone]   [facility]  [phone]   [address] [place] [address], [address]  2401 [facility] [place] 1000 [address] [facility] [phone]